# Patient Record
Sex: FEMALE | Race: BLACK OR AFRICAN AMERICAN | NOT HISPANIC OR LATINO | Employment: FULL TIME | URBAN - METROPOLITAN AREA
[De-identification: names, ages, dates, MRNs, and addresses within clinical notes are randomized per-mention and may not be internally consistent; named-entity substitution may affect disease eponyms.]

---

## 2017-07-30 ENCOUNTER — HOSPITAL ENCOUNTER (EMERGENCY)
Facility: HOSPITAL | Age: 22
Discharge: HOME | End: 2017-07-30
Attending: FAMILY MEDICINE
Payer: MEDICAID

## 2017-07-30 VITALS
BODY MASS INDEX: 43.82 KG/M2 | HEIGHT: 65 IN | HEART RATE: 84 BPM | OXYGEN SATURATION: 98 % | DIASTOLIC BLOOD PRESSURE: 70 MMHG | WEIGHT: 263 LBS | SYSTOLIC BLOOD PRESSURE: 125 MMHG | TEMPERATURE: 99 F | RESPIRATION RATE: 18 BRPM

## 2017-07-30 DIAGNOSIS — L72.0 EPIDERMOID CYST OF SKIN OF CHEST: Primary | ICD-10-CM

## 2017-07-30 PROCEDURE — 99282 EMERGENCY DEPT VISIT SF MDM: CPT | Mod: ,,, | Performed by: FAMILY MEDICINE

## 2017-07-30 PROCEDURE — 99283 EMERGENCY DEPT VISIT LOW MDM: CPT

## 2017-07-30 RX ORDER — SULFAMETHOXAZOLE AND TRIMETHOPRIM 800; 160 MG/1; MG/1
1 TABLET ORAL 2 TIMES DAILY
Qty: 14 TABLET | Refills: 0 | Status: SHIPPED | OUTPATIENT
Start: 2017-07-30 | End: 2017-08-06

## 2017-07-30 NOTE — ED NOTES
Patient identifiers verified and correct for Ms Ritchie  C/C: Abscess to mid chest area  APPEARANCE: awake and alert in NAD.  SKIN: warm, dry and intact. No breakdown or bruising.  MUSCULOSKELETAL: Patient moving all extremities spontaneously,noted to palpation, pain with mvmt Ambulates independently.  RESPIRATORY: Denies shortness of breath.Respirations unlabored.   CARDIAC: Denies CP, 2+ distal pulses; no peripheral edema  ABDOMEN: S/ND/NT, Denies nausea  : voids spontaneously, denies difficulty  Neurologic: AAO x 4; follows commands equal strength in all extremities; denies numbness/tingling. Denies dizziness

## 2017-07-31 NOTE — ED PROVIDER NOTES
Encounter Date: 7/30/2017    SCRIBE #1 NOTE: I, Roxana Scott, am scribing for, and in the presence of,  Dr. Saha. I have scribed the following portions of the note - Other sections scribed: HPI ROS PE.       History     Chief Complaint   Patient presents with    Chest Pain     i have a knot to my chest area, painful, just noticed last night     Time seen by provider: 7:04 PM    This is a 22 y.o. female PM Hx of asthma who presents with complaint of CP that she noticed last night. Pt states there is a knot in her chest that hurts and pain is exacerbated by movement. She also c/o SOB that feels like prior episodes of asthma. Pt denies nausea, vomiting, trouble swallowing, or any other symptoms at this time. She states she took a Tylenol with minimum relief of pain.       The history is provided by the patient and medical records.     Review of patient's allergies indicates:   Allergen Reactions    Shellfish containing products      Past Medical History:   Diagnosis Date    Asthma      Past Surgical History:   Procedure Laterality Date    GALLBLADDER SURGERY       Family History   Problem Relation Age of Onset    Heart disease Father      Social History   Substance Use Topics    Smoking status: Never Smoker    Smokeless tobacco: Never Used    Alcohol use No     Review of Systems   Constitutional: Negative for fever.   HENT: Negative for trouble swallowing.    Respiratory: Positive for shortness of breath (similar to prior episodes of asthma).    Cardiovascular: Positive for chest pain (associated to knot in her chest).   Gastrointestinal: Negative for nausea and vomiting.       Physical Exam     Initial Vitals [07/30/17 1843]   BP Pulse Resp Temp SpO2   125/70 84 18 99 °F (37.2 °C) 98 %      MAP       88.33         Physical Exam    Nursing note and vitals reviewed.  Constitutional: She appears well-developed and well-nourished. She is not diaphoretic. No distress.   HENT:   Head: Normocephalic and atraumatic.    Neck: Neck supple.   Cardiovascular: Normal rate, regular rhythm and normal heart sounds.   No murmur heard.  Pulmonary/Chest: Breath sounds normal. No respiratory distress. She has no wheezes. She has no rhonchi. She has no rales.   Small palpable subcutaneous nodule between the breasts that is smooth, round, and freely movable. Mild tenderness with no induration or fluctuance.    Neurological: She is alert and oriented to person, place, and time.   Skin: Skin is warm and dry.   No acute abnormalities.         ED Course   Procedures  Labs Reviewed - No data to display          Medical Decision Making:   History:   Old Medical Records: I decided to obtain old medical records.  ED Management:  Patient with a mildly tender, smooth mass in her left chest just adjacent to her left breast.  This appears to be superficial and an epidermoid origin.  Treat as an infected epidermoid cyst that she is cautioned that she should have this followed up.  Reexamined to ensure that it disappears and ensure that no workup for true breast mass is appropriate is indicated.  Patient stable for discharge            Scribe Attestation:   Scribe #1: I performed the above scribed service and the documentation accurately describes the services I performed. I attest to the accuracy of the note.    Attending Attestation:           Physician Attestation for Scribe:  Physician Attestation Statement for Scribe #1: I, Dr. Saha, reviewed documentation, as scribed by Roxana Scott in my presence, and it is both accurate and complete.                 ED Course     Clinical Impression:   The encounter diagnosis was Epidermoid cyst of skin of chest.    Disposition:   Disposition: Discharged  Condition: Stable                        Candido Saha MD  07/30/17 2791

## 2017-09-25 ENCOUNTER — HOSPITAL ENCOUNTER (EMERGENCY)
Facility: HOSPITAL | Age: 22
Discharge: HOME | End: 2017-09-25
Attending: EMERGENCY MEDICINE
Payer: MEDICAID

## 2017-09-25 VITALS
WEIGHT: 267 LBS | SYSTOLIC BLOOD PRESSURE: 115 MMHG | OXYGEN SATURATION: 97 % | RESPIRATION RATE: 16 BRPM | BODY MASS INDEX: 44.48 KG/M2 | DIASTOLIC BLOOD PRESSURE: 53 MMHG | HEART RATE: 80 BPM | TEMPERATURE: 99 F | HEIGHT: 65 IN
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
## 2017-09-25 DIAGNOSIS — J45.901 ASTHMA EXACERBATION: Primary | ICD-10-CM

## 2017-09-25 DIAGNOSIS — S00.81XA FOREHEAD ABRASION, INITIAL ENCOUNTER: ICD-10-CM
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
## 2017-09-25 PROCEDURE — 99283 EMERGENCY DEPT VISIT LOW MDM: CPT | Mod: 25

## 2017-09-25 PROCEDURE — 99284 EMERGENCY DEPT VISIT MOD MDM: CPT | Mod: ,,, | Performed by: PHYSICIAN ASSISTANT

## 2017-09-25 PROCEDURE — 25000242 PHARM REV CODE 250 ALT 637 W/ HCPCS: Performed by: PHYSICIAN ASSISTANT

## 2017-09-25 PROCEDURE — 63600175 PHARM REV CODE 636 W HCPCS: Performed by: PHYSICIAN ASSISTANT

## 2017-09-25 PROCEDURE — 94640 AIRWAY INHALATION TREATMENT: CPT

## 2017-09-25 PROCEDURE — 25000003 PHARM REV CODE 250: Performed by: PHYSICIAN ASSISTANT

## 2017-09-25 RX ORDER — ALBUTEROL SULFATE 90 UG/1
2 AEROSOL, METERED RESPIRATORY (INHALATION) EVERY 6 HOURS PRN
COMMUNITY

## 2017-09-25 RX ORDER — NAPROXEN 500 MG/1
500 TABLET ORAL
Status: COMPLETED | OUTPATIENT
Start: 2017-09-25 | End: 2017-09-25

## 2017-09-25 RX ORDER — IPRATROPIUM BROMIDE AND ALBUTEROL SULFATE 2.5; .5 MG/3ML; MG/3ML
3 SOLUTION RESPIRATORY (INHALATION)
Status: COMPLETED | OUTPATIENT
Start: 2017-09-25 | End: 2017-09-25

## 2017-09-25 RX ORDER — PREDNISONE 20 MG/1
60 TABLET ORAL
Status: COMPLETED | OUTPATIENT
Start: 2017-09-25 | End: 2017-09-25

## 2017-09-25 RX ORDER — PREDNISONE 20 MG/1
40 TABLET ORAL DAILY
Qty: 10 TABLET | Refills: 0 | Status: SHIPPED | OUTPATIENT
Start: 2017-09-25 | End: 2017-09-30

## 2017-09-25 RX ADMIN — IPRATROPIUM BROMIDE AND ALBUTEROL SULFATE 3 ML: .5; 3 SOLUTION RESPIRATORY (INHALATION) at 10:09

## 2017-09-25 RX ADMIN — PREDNISONE 60 MG: 20 TABLET ORAL at 11:09

## 2017-09-25 RX ADMIN — NAPROXEN 500 MG: 500 TABLET ORAL at 09:09
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
## 2018-03-01 ENCOUNTER — HOSPITAL ENCOUNTER (EMERGENCY)
Facility: OTHER | Age: 23
Discharge: HOME | End: 2018-03-02
Attending: EMERGENCY MEDICINE
Payer: MEDICAID

## 2018-03-01 VITALS
OXYGEN SATURATION: 98 % | HEART RATE: 62 BPM | SYSTOLIC BLOOD PRESSURE: 130 MMHG | BODY MASS INDEX: 38.77 KG/M2 | RESPIRATION RATE: 20 BRPM | TEMPERATURE: 98 F | DIASTOLIC BLOOD PRESSURE: 69 MMHG | WEIGHT: 247 LBS | HEIGHT: 67 IN

## 2018-03-01 DIAGNOSIS — K08.89 PAIN, DENTAL: Primary | ICD-10-CM

## 2018-03-01 DIAGNOSIS — H92.02 OTALGIA OF LEFT EAR: ICD-10-CM

## 2018-03-01 LAB
B-HCG UR QL: NEGATIVE
CTP QC/QA: YES

## 2018-03-01 PROCEDURE — 81025 URINE PREGNANCY TEST: CPT | Performed by: EMERGENCY MEDICINE

## 2018-03-01 PROCEDURE — 99283 EMERGENCY DEPT VISIT LOW MDM: CPT | Mod: 25

## 2018-03-01 RX ORDER — PENICILLIN V POTASSIUM 500 MG/1
500 TABLET, FILM COATED ORAL 4 TIMES DAILY
Qty: 40 TABLET | Refills: 0 | Status: SHIPPED | OUTPATIENT
Start: 2018-03-01 | End: 2018-03-08

## 2018-03-01 RX ORDER — IBUPROFEN 800 MG/1
800 TABLET ORAL EVERY 6 HOURS PRN
Qty: 30 TABLET | Refills: 0 | OUTPATIENT
Start: 2018-03-01 | End: 2020-02-04

## 2018-03-01 RX ORDER — IBUPROFEN 400 MG/1
800 TABLET ORAL
Status: COMPLETED | OUTPATIENT
Start: 2018-03-02 | End: 2018-03-02

## 2018-03-02 PROCEDURE — 25000003 PHARM REV CODE 250: Performed by: EMERGENCY MEDICINE

## 2018-03-02 RX ADMIN — IBUPROFEN 800 MG: 400 TABLET, FILM COATED ORAL at 12:03

## 2018-03-02 NOTE — ED TRIAGE NOTES
"+left ear "sharp" pain that radiates to jaw that started around 3pm. Pt denies fever, sob, fever, dizziness, vision change, hearing loss. Pt denies eating anything hard or that could cause this pain.  Pt denies taking any medication for the pain     "

## 2018-03-02 NOTE — ED PROVIDER NOTES
"Encounter Date: 3/1/2018    SCRIBE #1 NOTE: I, Deepthi Tapia, am scribing for, and in the presence of, Dr. Matta.       History     Chief Complaint   Patient presents with    Otalgia     Pt states she has L ear pain, L neck and face x 1 day.      Time seen by provider: 11:22 PM    This is a 23 y.o. Female, with history of asthma, who presents with complaint of severe left-sided dental pain that has been constant approximately eight hours ago. The upper and lower dental pain is described as severe. She has not seen a dentist "in a while," and denies undergoing a wisdom tooth extraction. The patient reports left ear pain that radiates to the jaw. She denies pain to the external ear, but has noticed that her ears have "popped" during the day. She reports taking Goody's with no improvement. Patient reports congestion, and denies fever, chills, decreased hearing, ear drainage, trouble swallowing, facial swelling, sore throat, cough, SOB, headache, or myalgias.  She denies use of tobacco.        The history is provided by the patient.     Review of patient's allergies indicates:   Allergen Reactions    Shellfish containing products      Past Medical History:   Diagnosis Date    Asthma      Past Surgical History:   Procedure Laterality Date    CHOLECYSTECTOMY      GALLBLADDER SURGERY       Family History   Problem Relation Age of Onset    Heart disease Father      Social History   Substance Use Topics    Smoking status: Never Smoker    Smokeless tobacco: Never Used    Alcohol use No     Review of Systems   Constitutional: Negative for chills and fever.   HENT: Positive for congestion, dental problem (left upper and lower dental pain) and ear pain (left). Negative for ear discharge, facial swelling, hearing loss, sore throat and trouble swallowing.    Respiratory: Negative for cough and shortness of breath.    Cardiovascular: Negative for chest pain.   Gastrointestinal: Negative for nausea.   Genitourinary: " Negative for dysuria.   Musculoskeletal: Negative for back pain and myalgias.   Skin: Negative for rash.   Neurological: Negative for weakness and headaches.   Hematological: Does not bruise/bleed easily.       Physical Exam     Initial Vitals [03/01/18 2234]   BP Pulse Resp Temp SpO2   130/69 62 20 98.4 °F (36.9 °C) 98 %      MAP       89.33         Physical Exam    Nursing note and vitals reviewed.  Constitutional: She appears well-developed and well-nourished. She is not diaphoretic. No distress.   Morbidly Obese.   HENT:   Head: Normocephalic and atraumatic.   Right Ear: Tympanic membrane and external ear normal.   Left Ear: Tympanic membrane and external ear normal.   Mouth/Throat: Oropharynx is clear and moist.   Tenderness with percussion of the left rear most maxillary and mandibular molars. Mild erythema of the adjacent gums. No visible abscess. No trismus. No otitis media.   Eyes: EOM are normal. Pupils are equal, round, and reactive to light. No scleral icterus.   No pallor or icterus.   Neck: Normal range of motion.   Cardiovascular: Normal rate, regular rhythm and normal heart sounds. Exam reveals no gallop and no friction rub.    No murmur heard.  Pulmonary/Chest: Breath sounds normal. No respiratory distress. She has no wheezes. She has no rhonchi. She has no rales.   Abdominal: Soft. There is no tenderness. There is no rebound and no guarding.   Musculoskeletal: Normal range of motion. She exhibits no edema or tenderness.   Lymphadenopathy:     She has no cervical adenopathy.   Neurological: She is alert and oriented to person, place, and time.   Skin: Skin is warm and dry. No rash and no abscess noted. No erythema. No pallor.   Psychiatric: She has a normal mood and affect. Her behavior is normal. Judgment and thought content normal.         ED Course   Procedures  Labs Reviewed   POCT URINE PREGNANCY             Medical Decision Making:   Clinical Tests:   Lab Tests: Ordered and Reviewed             Scribe Attestation:   Scribe #1: I performed the above scribed service and the documentation accurately describes the services I performed. I attest to the accuracy of the note.    Attending Attestation:           Physician Attestation for Scribe:  Physician Attestation Statement for Scribe #1: I, Dr. Matta, reviewed documentation, as scribed by Deepthi Tapia in my presence, and it is both accurate and complete.           Patient presents with ear and dental pain.  On exam no evidence of otitis media but she does have tenderness to percussion of teeth.  Suspect dental source of pain.  Start course of penicillin for possible dental infection along with anti-inflammatories.  Stressed need to follow-up with dental clinic reports she already has a dentist             Clinical Impression:       1. Pain, dental    2. Otalgia of left ear                          Harpal Matta II, MD  03/02/18 0203

## 2018-03-04 ENCOUNTER — HOSPITAL ENCOUNTER (EMERGENCY)
Facility: OTHER | Age: 23
Discharge: HOME | End: 2018-03-04
Attending: EMERGENCY MEDICINE
Payer: MEDICAID

## 2018-03-04 VITALS
TEMPERATURE: 98 F | OXYGEN SATURATION: 99 % | DIASTOLIC BLOOD PRESSURE: 57 MMHG | SYSTOLIC BLOOD PRESSURE: 121 MMHG | HEIGHT: 66 IN | WEIGHT: 247 LBS | RESPIRATION RATE: 18 BRPM | HEART RATE: 72 BPM | BODY MASS INDEX: 39.7 KG/M2

## 2018-03-04 DIAGNOSIS — H10.9 CONJUNCTIVITIS OF LEFT EYE, UNSPECIFIED CONJUNCTIVITIS TYPE: Primary | ICD-10-CM

## 2018-03-04 PROCEDURE — 99283 EMERGENCY DEPT VISIT LOW MDM: CPT

## 2018-03-04 RX ORDER — ERYTHROMYCIN 5 MG/G
OINTMENT OPHTHALMIC
Qty: 1 TUBE | Refills: 0 | Status: SHIPPED | OUTPATIENT
Start: 2018-03-04 | End: 2020-02-04 | Stop reason: CLARIF

## 2018-03-04 NOTE — ED PROVIDER NOTES
Encounter Date: 3/4/2018    SCRIBE #1 NOTE: I, Kamlesh Vega, am scribing for, and in the presence of, Dr. Rae.       History     Chief Complaint   Patient presents with    Conjunctivitis     L eye pain and redness since this morning. currently being tx for dental pain.     3:55 PM    Patient is a 23 y.o. female who presents to the ED with complaint of left eye pain, which began this morning. Pain is constant. She reports associated redness as well as discharge from the left eye. She reports no problems with the right eye, rash, or shortness of breath. She reports no known contacts with similar symptoms. She works at a school. She reports taking penicillin and ibuprofen this morning for dental pain. She also took Claritin prior to onset of eye pain. She reports pain in all four wisdom teeth, and reports plans to have these extracted. She has a dentist appointment tomorrow. She has no additional complaints.      The history is provided by the patient.     Review of patient's allergies indicates:   Allergen Reactions    Shellfish containing products      Past Medical History:   Diagnosis Date    Asthma      Past Surgical History:   Procedure Laterality Date    CHOLECYSTECTOMY      GALLBLADDER SURGERY       Family History   Problem Relation Age of Onset    Heart disease Father      Social History   Substance Use Topics    Smoking status: Never Smoker    Smokeless tobacco: Never Used    Alcohol use No     Review of Systems   Constitutional: Negative for chills and fever.   HENT: Positive for dental problem.    Eyes: Positive for pain (left), discharge (left) and redness (left).   Respiratory: Negative for shortness of breath.    Cardiovascular: Negative for chest pain.   Gastrointestinal: Negative for nausea and vomiting.   Musculoskeletal: Negative for back pain.   Skin: Negative for rash.   Neurological: Negative for dizziness and headaches.   Hematological: Does not bruise/bleed easily.    Psychiatric/Behavioral: Negative for confusion.       Physical Exam     Initial Vitals [03/04/18 1550]   BP Pulse Resp Temp SpO2   (!) 121/57 72 18 98.3 °F (36.8 °C) 99 %      MAP       78.33         Physical Exam    Nursing note and vitals reviewed.  Constitutional: She appears well-developed and well-nourished. No distress.   HENT:   Head: Normocephalic and atraumatic.   Mouth/Throat: Oropharynx is clear and moist.   Eyes: EOM are normal. Pupils are equal, round, and reactive to light. Left eye exhibits discharge.   Left eye: injected conjunctivae. Discharge present from the left eye. No periorbital swelling. No pain with movement of the eye ball. EOMI.   Neck: Neck supple.   Pulmonary/Chest: No respiratory distress.   Musculoskeletal: Normal range of motion.   Neurological: She is alert and oriented to person, place, and time.   Skin: Skin is warm and dry.   Psychiatric: She has a normal mood and affect. Her behavior is normal. Judgment and thought content normal.         ED Course   Procedures  Labs Reviewed   POCT URINE PREGNANCY             Medical Decision Making:   Clinical Tests:   Lab Tests: Ordered and Reviewed  ED Management:  23-year-old female with what appears to be conjunctivitis of the left eye.  No history suggest toenail abrasion.  No signs of orbital or periorbital cellulitis.  We'll discharge with erythromycin ointment.            Scribe Attestation:   Scribe #1: I performed the above scribed service and the documentation accurately describes the services I performed. I attest to the accuracy of the note.    Attending Attestation:           Physician Attestation for Scribe:  Physician Attestation Statement for Scribe #1: I, Dr. Rae, reviewed documentation, as scribed by Kamlesh Vega in my presence, and it is both accurate and complete.                    Clinical Impression:     1. Conjunctivitis of left eye, unspecified conjunctivitis type                             Devendra LAZARO  DO Butch  03/04/18 1646

## 2018-03-04 NOTE — ED TRIAGE NOTES
"Pt presents to the ED with conjunctivitis to her left eye starting today. Pt states that she woke up from a nap and it was worse. Pain is unrelieved by ibuprofen. Pain is rated 8/10, described as "stabbing".   "

## 2018-03-04 NOTE — ED NOTES
Patient informed of need for urine sample for analysis. Pt states she is unable to go at present but was just seen in the ED Thursday. Specimen cup provided, patient will notify me when sample is obtained.

## 2018-08-15 ENCOUNTER — HOSPITAL ENCOUNTER (EMERGENCY)
Facility: OTHER | Age: 23
Discharge: HOME | End: 2018-08-15
Attending: EMERGENCY MEDICINE
Payer: MEDICAID

## 2018-08-15 VITALS
TEMPERATURE: 99 F | OXYGEN SATURATION: 99 % | WEIGHT: 264 LBS | BODY MASS INDEX: 46.78 KG/M2 | DIASTOLIC BLOOD PRESSURE: 51 MMHG | SYSTOLIC BLOOD PRESSURE: 100 MMHG | HEART RATE: 82 BPM | RESPIRATION RATE: 18 BRPM | HEIGHT: 63 IN

## 2018-08-15 DIAGNOSIS — R07.9 CHEST PAIN: Primary | ICD-10-CM

## 2018-08-15 DIAGNOSIS — J45.20 MILD INTERMITTENT ASTHMA WITHOUT COMPLICATION: ICD-10-CM

## 2018-08-15 LAB
B-HCG UR QL: NEGATIVE
CTP QC/QA: YES

## 2018-08-15 PROCEDURE — 94761 N-INVAS EAR/PLS OXIMETRY MLT: CPT

## 2018-08-15 PROCEDURE — 25000242 PHARM REV CODE 250 ALT 637 W/ HCPCS: Performed by: PHYSICIAN ASSISTANT

## 2018-08-15 PROCEDURE — 93010 ELECTROCARDIOGRAM REPORT: CPT | Mod: ,,, | Performed by: INTERNAL MEDICINE

## 2018-08-15 PROCEDURE — 81025 URINE PREGNANCY TEST: CPT | Performed by: EMERGENCY MEDICINE

## 2018-08-15 PROCEDURE — 94640 AIRWAY INHALATION TREATMENT: CPT

## 2018-08-15 PROCEDURE — 93005 ELECTROCARDIOGRAM TRACING: CPT

## 2018-08-15 PROCEDURE — 25000003 PHARM REV CODE 250: Performed by: PHYSICIAN ASSISTANT

## 2018-08-15 PROCEDURE — 99284 EMERGENCY DEPT VISIT MOD MDM: CPT | Mod: 25

## 2018-08-15 RX ORDER — IPRATROPIUM BROMIDE AND ALBUTEROL SULFATE 2.5; .5 MG/3ML; MG/3ML
3 SOLUTION RESPIRATORY (INHALATION)
Status: COMPLETED | OUTPATIENT
Start: 2018-08-15 | End: 2018-08-15

## 2018-08-15 RX ORDER — NAPROXEN 500 MG/1
500 TABLET ORAL 2 TIMES DAILY WITH MEALS
Qty: 20 TABLET | Refills: 0 | OUTPATIENT
Start: 2018-08-15 | End: 2020-02-04

## 2018-08-15 RX ORDER — ORPHENADRINE CITRATE 100 MG/1
100 TABLET, EXTENDED RELEASE ORAL
Status: COMPLETED | OUTPATIENT
Start: 2018-08-15 | End: 2018-08-15

## 2018-08-15 RX ADMIN — IPRATROPIUM BROMIDE AND ALBUTEROL SULFATE 3 ML: .5; 3 SOLUTION RESPIRATORY (INHALATION) at 06:08

## 2018-08-15 RX ADMIN — ORPHENADRINE CITRATE 100 MG: 100 TABLET, EXTENDED RELEASE ORAL at 07:08

## 2018-08-15 RX ADMIN — IPRATROPIUM BROMIDE AND ALBUTEROL SULFATE 3 ML: .5; 3 SOLUTION RESPIRATORY (INHALATION) at 07:08

## 2018-08-15 NOTE — ED PROVIDER NOTES
Encounter Date: 8/15/2018       History     Chief Complaint   Patient presents with    Chest Pain     Pt reports midsternal chest tightness beginning Monday and progressively worsening. Reports mild SOB.     Patient is a 23-year-old female with asthma who presents to the ED with chest pain. Patient reports gradual onset of midsternal chest pain while she was at work 3 days ago.  She states the pain is constantly present.  She reports taking Motrin without relief.  She denies any radiation of this pain. She states the pain is worse with breathing.  She denies any relieving factors.  Patient reports using her breathing treatment this morning.  She states not provide her with relief.  She states consult similar to an asthma flare up.  She denies fever, cough, or URI type symptoms. She denies leg swelling.       The history is provided by the patient.     Review of patient's allergies indicates:   Allergen Reactions    Shellfish containing products      Past Medical History:   Diagnosis Date    Asthma      Past Surgical History:   Procedure Laterality Date    CHOLECYSTECTOMY      GALLBLADDER SURGERY       Family History   Problem Relation Age of Onset    Heart disease Father      Social History     Tobacco Use    Smoking status: Never Smoker    Smokeless tobacco: Never Used   Substance Use Topics    Alcohol use: No    Drug use: No     Review of Systems   Constitutional: Negative for chills and fever.   HENT: Negative for congestion and sore throat.    Eyes: Negative for pain.   Respiratory: Positive for shortness of breath. Negative for cough and wheezing.    Cardiovascular: Positive for chest pain.   Gastrointestinal: Negative for abdominal pain, diarrhea, nausea and vomiting.   Genitourinary: Negative for dysuria.   Musculoskeletal: Negative for arthralgias.   Skin: Negative for rash.   Neurological: Negative for headaches.       Physical Exam     Initial Vitals [08/15/18 1757]   BP Pulse Resp Temp SpO2    117/70 85 16 98.8 °F (37.1 °C) 96 %      MAP       --         Physical Exam    Constitutional: Vital signs are normal. She is cooperative.   Patient appears to be in no acute distress.  She speaks in clear in full sentences.   HENT:   Head: Normocephalic and atraumatic.   Mouth/Throat: Oropharynx is clear and moist.   Eyes: EOM are normal. Pupils are equal, round, and reactive to light.   Neck: Normal range of motion. Neck supple.   Cardiovascular: Normal rate, regular rhythm and intact distal pulses.   Pulmonary/Chest: No respiratory distress.       Mildly decreased air movement to bilateral lung fields.  No wheezing.  Chest wall tender to palpation in the depicted areas above.   Abdominal: Soft. Bowel sounds are normal. There is no tenderness.   Musculoskeletal: Normal range of motion. She exhibits no edema.   Neurological: She is alert and oriented to person, place, and time. No cranial nerve deficit. GCS eye subscore is 4. GCS verbal subscore is 5. GCS motor subscore is 6.   Skin: Skin is warm and dry. Capillary refill takes less than 2 seconds. No rash noted.   Psychiatric: She has a normal mood and affect. Her behavior is normal.         ED Course   Procedures  Labs Reviewed   POCT URINE PREGNANCY     EKG Readings: (Independently Interpreted)   Sinus bradycardia rate of 57 beats per minute.  No STEMI.  No ischemic changes.  T-wave inversions from previous EKG from 2014 have resolved.       Imaging Results          X-Ray Chest PA And Lateral (Final result)  Result time 08/15/18 19:30:55    Final result by Beth Melgar MD (08/15/18 19:30:55)                 Impression:      No acute intrathoracic process identified.      Electronically signed by: Beth Melgar MD  Date:    08/15/2018  Time:    19:30             Narrative:    EXAMINATION:  XR CHEST PA AND LATERAL    CLINICAL HISTORY:  Chest pain, unspecified    TECHNIQUE:  PA and lateral views of the chest were performed.    COMPARISON:  September  2016.    FINDINGS:  Cardiac silhouette is normal in size.  Lungs are symmetrically expanded.  No evidence of focal consolidative process, pneumothorax, or significant effusion.  No acute osseous abnormality identified.                                 Medical Decision Making:   Initial Assessment:   Urgent evaluation of a 23 y.o. Female presenting to the emergency department complaining of chest pain. Patient is afebrile, nontoxic appearing and hemodynamically stable.  Patient's chest pain appears to be musculoskeletal in nature.  Chest pain is reproducible with palpation on exam.  I do not suspect ACS.  She is PERC negative.  I do not suspect PE.  No wheezing on exam, however there is diminished air movement.  Will provide breathing treatment and obtain chest x-ray and reassess.  ED Management:  Chest x-ray reveals no acute intra cardio thoracic process.  After receiving duo nebulizer treatment, patient reports the her shortness of breath has improved.  Patient reports no was leave with Norflex.  She states this made her feel drowsy.  Patient's pain is likely secondary to musculoskeletal strain versus costochondritis.  Patient states Motrin home is not working.  Will send home with [facility].  She is strongly encouraged to follow up with her primary care provider within the next week and return here for new or worsening symptoms. She has no other complaints this time is stable for discharge.  This note was created using [employer]. There may be typographical errors secondary to dictation.                       Clinical Impression:     1. Chest pain    2. Mild intermittent asthma without complication                               Nguyễn Blue PA-C  08/15/18 1953

## 2018-08-15 NOTE — ED NOTES
Pt unable to urinate at this time. Pt given specimen cup and instructions on proper clean catch technique.

## 2018-08-15 NOTE — ED TRIAGE NOTES
22 yo female to ED w/ complaints of chest pain and SOB. Pt has hx of asthma. AAO x3, 96% on RA, VSS, ED workup in progress.

## 2018-12-20 ENCOUNTER — HOSPITAL ENCOUNTER (EMERGENCY)
Facility: OTHER | Age: 23
Discharge: HOME | End: 2018-12-20
Attending: EMERGENCY MEDICINE
Payer: MEDICAID

## 2018-12-20 VITALS
BODY MASS INDEX: 48.15 KG/M2 | WEIGHT: 289 LBS | TEMPERATURE: 98 F | HEART RATE: 66 BPM | DIASTOLIC BLOOD PRESSURE: 54 MMHG | SYSTOLIC BLOOD PRESSURE: 96 MMHG | RESPIRATION RATE: 20 BRPM | HEIGHT: 65 IN | OXYGEN SATURATION: 95 %

## 2018-12-20 DIAGNOSIS — R10.32 LEFT LOWER QUADRANT PAIN: Primary | ICD-10-CM

## 2018-12-20 DIAGNOSIS — N94.9 ADNEXAL CYST: ICD-10-CM

## 2018-12-20 LAB
ANION GAP SERPL CALC-SCNC: 6 MMOL/L
B-HCG UR QL: NEGATIVE
BASOPHILS # BLD AUTO: 0.03 K/UL
BASOPHILS NFR BLD: 0.4 %
BILIRUB UR QL STRIP: NEGATIVE
BUN SERPL-MCNC: 10 MG/DL
CALCIUM SERPL-MCNC: 9.5 MG/DL
CHLORIDE SERPL-SCNC: 107 MMOL/L
CLARITY UR: CLEAR
CO2 SERPL-SCNC: 24 MMOL/L
COLOR UR: YELLOW
CREAT SERPL-MCNC: 0.8 MG/DL
CTP QC/QA: YES
DIFFERENTIAL METHOD: NORMAL
EOSINOPHIL # BLD AUTO: 0.2 K/UL
EOSINOPHIL NFR BLD: 2.1 %
ERYTHROCYTE [DISTWIDTH] IN BLOOD BY AUTOMATED COUNT: 13.8 %
EST. GFR  (AFRICAN AMERICAN): >60 ML/MIN/1.73 M^2
EST. GFR  (NON AFRICAN AMERICAN): >60 ML/MIN/1.73 M^2
GLUCOSE SERPL-MCNC: 128 MG/DL
GLUCOSE UR QL STRIP: NEGATIVE
HCT VFR BLD AUTO: 38.1 %
HGB BLD-MCNC: 12.5 G/DL
HGB UR QL STRIP: NEGATIVE
KETONES UR QL STRIP: NEGATIVE
LEUKOCYTE ESTERASE UR QL STRIP: NEGATIVE
LYMPHOCYTES # BLD AUTO: 2.2 K/UL
LYMPHOCYTES NFR BLD: 30.4 %
MCH RBC QN AUTO: 28.8 PG
MCHC RBC AUTO-ENTMCNC: 32.8 G/DL
MCV RBC AUTO: 88 FL
MONOCYTES # BLD AUTO: 0.5 K/UL
MONOCYTES NFR BLD: 7.5 %
NEUTROPHILS # BLD AUTO: 4.2 K/UL
NEUTROPHILS NFR BLD: 59.3 %
NITRITE UR QL STRIP: NEGATIVE
PH UR STRIP: 6 [PH] (ref 5–8)
PLATELET # BLD AUTO: 291 K/UL
PMV BLD AUTO: 10.2 FL
POTASSIUM SERPL-SCNC: 4.3 MMOL/L
PROT UR QL STRIP: NEGATIVE
RBC # BLD AUTO: 4.34 M/UL
SODIUM SERPL-SCNC: 137 MMOL/L
SP GR UR STRIP: 1.02 (ref 1–1.03)
URN SPEC COLLECT METH UR: NORMAL
UROBILINOGEN UR STRIP-ACNC: 1 EU/DL
WBC # BLD AUTO: 7.1 K/UL

## 2018-12-20 PROCEDURE — 25500020 PHARM REV CODE 255: Performed by: EMERGENCY MEDICINE

## 2018-12-20 PROCEDURE — 81003 URINALYSIS AUTO W/O SCOPE: CPT

## 2018-12-20 PROCEDURE — 96374 THER/PROPH/DIAG INJ IV PUSH: CPT

## 2018-12-20 PROCEDURE — 96375 TX/PRO/DX INJ NEW DRUG ADDON: CPT

## 2018-12-20 PROCEDURE — 81025 URINE PREGNANCY TEST: CPT | Performed by: EMERGENCY MEDICINE

## 2018-12-20 PROCEDURE — 85025 COMPLETE CBC W/AUTO DIFF WBC: CPT

## 2018-12-20 PROCEDURE — 96376 TX/PRO/DX INJ SAME DRUG ADON: CPT

## 2018-12-20 PROCEDURE — 63600175 PHARM REV CODE 636 W HCPCS: Performed by: PHYSICIAN ASSISTANT

## 2018-12-20 PROCEDURE — 99284 EMERGENCY DEPT VISIT MOD MDM: CPT | Mod: 25

## 2018-12-20 PROCEDURE — 80048 BASIC METABOLIC PNL TOTAL CA: CPT

## 2018-12-20 RX ORDER — MORPHINE SULFATE 2 MG/ML
6 INJECTION, SOLUTION INTRAMUSCULAR; INTRAVENOUS
Status: COMPLETED | OUTPATIENT
Start: 2018-12-20 | End: 2018-12-20

## 2018-12-20 RX ORDER — ETODOLAC 500 MG/1
500 TABLET, FILM COATED ORAL EVERY 12 HOURS PRN
Qty: 20 TABLET | Refills: 0 | Status: SHIPPED | OUTPATIENT
Start: 2018-12-20

## 2018-12-20 RX ORDER — ONDANSETRON 2 MG/ML
4 INJECTION INTRAMUSCULAR; INTRAVENOUS
Status: COMPLETED | OUTPATIENT
Start: 2018-12-20 | End: 2018-12-20

## 2018-12-20 RX ADMIN — IOHEXOL 100 ML: 350 INJECTION, SOLUTION INTRAVENOUS at 09:12

## 2018-12-20 RX ADMIN — ONDANSETRON 4 MG: 2 INJECTION INTRAMUSCULAR; INTRAVENOUS at 08:12

## 2018-12-20 RX ADMIN — MORPHINE SULFATE 6 MG: 2 INJECTION, SOLUTION INTRAMUSCULAR; INTRAVENOUS at 07:12

## 2018-12-20 RX ADMIN — MORPHINE SULFATE 6 MG: 2 INJECTION, SOLUTION INTRAMUSCULAR; INTRAVENOUS at 08:12

## 2018-12-21 NOTE — ED PROVIDER NOTES
Encounter Date: 12/20/2018       History     Chief Complaint   Patient presents with    Abdominal Pain     pt reports lower abd pain x 1 week; pt describes pain as shooting pain from lower abd left side to the left lower back side; pt denies NVD; denies GI/ symptoms     Patient is a 23-year-old female with asthma who presents to the ED with pelvic pain. Patient reports 1 week history of left-sided pelvic pain. She states she saw her Ob Gyn 2 days ago who checked her Mirena strings (patient had Mirena placed over 3 years ago).  She was told it this strength ran good position.  Patient has outpatient ultrasound scheduled for next week was told to come the ED the pain worsened.  Patient states the pain is getting more severe and is not radiating to her lower back.  She states the pain waxes and wanes.  She denies dysuria, hematuria, fever, nausea, or emesis.  She denies vaginal bleeding or vaginal discharge. She also had negative STD workup 2 days ago.  She reports taking 100 mg of Motrin this morning without relief of her pain.      The history is provided by the patient.     Review of patient's allergies indicates:   Allergen Reactions    Shellfish containing products      Past Medical History:   Diagnosis Date    Asthma      Past Surgical History:   Procedure Laterality Date    CHOLECYSTECTOMY      GALLBLADDER SURGERY       Family History   Problem Relation Age of Onset    Heart disease Father      Social History     Tobacco Use    Smoking status: Never Smoker    Smokeless tobacco: Never Used   Substance Use Topics    Alcohol use: No    Drug use: No     Review of Systems   Constitutional: Negative for chills and fever.   HENT: Negative for congestion and sore throat.    Eyes: Negative for pain.   Respiratory: Negative for shortness of breath.    Cardiovascular: Negative for chest pain.   Gastrointestinal: Negative for diarrhea, nausea and vomiting.   Genitourinary: Positive for pelvic pain. Negative for  dysuria, vaginal bleeding, vaginal discharge and vaginal pain.   Musculoskeletal: Negative for arthralgias.   Skin: Negative for rash.   Neurological: Negative for headaches.       Physical Exam     Initial Vitals [12/20/18 1834]   BP Pulse Resp Temp SpO2   116/79 82 18 98.3 °F (36.8 °C) 99 %      MAP       --         Physical Exam    Constitutional: Vital signs are normal. She is cooperative.   Obese AA female in no acute distress    HENT:   Head: Normocephalic and atraumatic.   Eyes: EOM are normal. Pupils are equal, round, and reactive to light.   Neck: Normal range of motion. Neck supple.   Cardiovascular: Normal rate, regular rhythm and intact distal pulses.   Pulmonary/Chest: Breath sounds normal. She has no wheezes. She has no rhonchi. She has no rales.   Abdominal: Soft. Bowel sounds are normal.   Left, lower pelvic pain. No peritoneal signs.   Musculoskeletal: Normal range of motion. She exhibits no edema.   Neurological: She is alert and oriented to person, place, and time. GCS eye subscore is 4. GCS verbal subscore is 5. GCS motor subscore is 6.   Skin: Skin is warm and dry. Capillary refill takes less than 2 seconds. No rash noted.   Psychiatric: She has a normal mood and affect. Her behavior is normal.         ED Course   Procedures  Labs Reviewed   BASIC METABOLIC PANEL - Abnormal; Notable for the following components:       Result Value    Glucose 128 (*)     Anion Gap 6 (*)     All other components within normal limits   URINALYSIS   CBC W/ AUTO DIFFERENTIAL   POCT URINE PREGNANCY          Imaging Results          CT Abdomen Pelvis With Contrast (Final result)  Result time 12/20/18 21:56:17    Final result by Kris Weaver MD (12/20/18 21:56:17)                 Impression:      IUD present.  2.3 cm left adnexal cyst.    No bowel obstruction or inflammatory changes noted.  No CT findings to suggest acute diverticulitis.    Additional findings as above.      Electronically signed by: Kris  MD Meg  Date:    12/20/2018  Time:    21:56             Narrative:    EXAMINATION:  CT ABDOMEN PELVIS WITH CONTRAST    CLINICAL HISTORY:  LLQ pain, suspect diverticulitis;    TECHNIQUE:  Low dose axial images, sagittal and coronal reformations were obtained from the lung bases to the pubic symphysis following the IV administration of 100 mL of Omnipaque 350 .  Oral contrast was not administered.    COMPARISON:  None.    FINDINGS:  Abdomen:    - Lower thorax:Unremarkable.    - Lung bases: Minimal dependent atelectatic change.    - Liver: No focal mass.    - Gallbladder: Surgically absent.    - Bile Ducts: No evidence of intra or extra hepatic biliary ductal dilation.    - Spleen: Negative.    - Kidneys: No hydronephrosis.  2.1 cm septated cyst lower pole left kidney.    - Adrenals: Unremarkable.    - Pancreas: No mass or peripancreatic fat stranding.    - Retroperitoneum:  No significant adenopathy.    - Vascular: No abdominal aortic aneurysm.    - Abdominal wall:  Unremarkable.    Pelvis:    No pelvic mass, adenopathy, or free fluid.  IUD present.  2.3 cm left adnexal cyst.    Bowel/Mesentery:    No evidence of bowel obstruction or inflammation.  Normal appendix.    Bones:  No acute osseous abnormality and no suspicious lytic or blastic lesion.                               US Pelvis Comp with Transvag NON-OB (xpd) (Final result)  Result time 12/20/18 20:57:58   Procedure changed from US Pelvis Complete Non OB     Final result by Beth Melgar MD (12/20/18 20:57:58)                 Impression:      IUD appears in appropriate position.    Otherwise no significant abnormalities identified.      Electronically signed by: Beth Melgar MD  Date:    12/20/2018  Time:    20:57             Narrative:    EXAMINATION:  US PELVIS COMP WITH TRANSVAG NON-OB (XPD)    CLINICAL HISTORY:  Left adnexal pain;    TECHNIQUE:  Transabdominal sonography of the pelvis was performed, followed by transvaginal sonography to better  evaluate the uterus and ovaries.    COMPARISON:  None.    FINDINGS:  The uterus measures 8.0 x 3.6 x 7.3 cm. Uterine parenchyma is somewhat heterogenous in echotexture without evidence for masses.  IUD is visualized and appears in appropriate position.  No evidence of abnormal endometrial thickening.    The right ovary is normal in size and measures 2.5 x 2.8 x 2.7 cm. The left ovary is normal in size and measures 2.1 x 2.5 x 4.2 cm.  Small cyst or dominant follicle is visualized within each ovary, which are within normal limits for patient of this age.  No adnexal abnormalities are seen.  Arterial and venous flow are preserved bilaterally. No significant free fluid is seen.                                 Medical Decision Making:   Initial Assessment:   Urgent evaluation of a 23 y.o. female presenting to the emergency department complaining of left-sided pelvic pain. Patient is afebrile, nontoxic appearing and hemodynamically stable.  Patient has significant tenderness on exam.  No peritoneal signs. Will obtain labs and ultrasound to rule out ovarian torsion versus ruptured ovarian cyst versus TOA.    ED Management:  UPT is negative.  Urinalysis reveals no abnormalities.  2050:  Patient reports her pain is unrelieved with 6 mg of IV morphine.  Will provide another 6 mg and reassess.  CBC reveals no leukocytosis or other abnormalities.  Metabolic panel reveals elevated glucose of 128.  No other metabolic disturbance.  9:00 p.m.- ultrasound of pelvis reveals IUD appears in appropriate position.  There are no significant abnormalities.  Ovaries are normal in size.  There is a small cyst or dominant follicle visualized within each over which are within normal limits for patient's age. No adnexal abnormalities.  Normal arterial and venous blood flow to bilateral ovaries.  No significant free fluid.  Given unclear etiology of patient's pain, will obtain CT abdomen for further workup.  CT abdomen pelvis reveals IUD  present with 2.3 cm left adnexal cyst.  No findings of diverticulitis.  No pelvic free fluid.  Possibly that left adnexal cyst is causing patient's pain. Given the patient's pain was controlled, no findings of ruptured cyst or free fluid I do believe outpatient follow-up is appropriate.  Will send home with high-dose anti-inflammatory.  Patient is advised to follow up with her gynecologist as needed given strict return precautions.  She has no other complaints this time is stable for discharge.  Case was discussed with attending physician who agrees to treatment and plan.  This note was created using [employer]ict[employer]. There may be typographical errors secondary to dictation.                       Clinical Impression:     1. Left lower quadrant pain    2. Adnexal cyst                               Nguyễn Blue PA-C  12/20/18 [id number]

## 2018-12-21 NOTE — ED TRIAGE NOTES
Pt arrived to ED with c/o LLQ pain that radiates to back x1 week. Pt denies n/v, sob, cp, dysuria, fever, chills, vaginal bleeding/discharge.

## 2019-07-25 ENCOUNTER — HOSPITAL ENCOUNTER (EMERGENCY)
Facility: OTHER | Age: 24
Discharge: HOME | End: 2019-07-25
Attending: EMERGENCY MEDICINE
Payer: MEDICAID

## 2019-07-25 VITALS
HEART RATE: 95 BPM | DIASTOLIC BLOOD PRESSURE: 69 MMHG | WEIGHT: 293 LBS | HEIGHT: 66 IN | RESPIRATION RATE: 18 BRPM | TEMPERATURE: 98 F | BODY MASS INDEX: 47.09 KG/M2 | SYSTOLIC BLOOD PRESSURE: 134 MMHG | OXYGEN SATURATION: 96 %

## 2019-07-25 DIAGNOSIS — R07.9 CHEST PAIN: ICD-10-CM

## 2019-07-25 DIAGNOSIS — R42 DIZZINESS: ICD-10-CM

## 2019-07-25 DIAGNOSIS — Z77.29 CARBON MONOXIDE EXPOSURE: Primary | ICD-10-CM

## 2019-07-25 LAB
B-HCG UR QL: NEGATIVE
CTP QC/QA: YES

## 2019-07-25 PROCEDURE — 99284 EMERGENCY DEPT VISIT MOD MDM: CPT | Mod: 25

## 2019-07-25 PROCEDURE — 81025 URINE PREGNANCY TEST: CPT | Performed by: NURSE PRACTITIONER

## 2019-07-25 PROCEDURE — 93005 ELECTROCARDIOGRAM TRACING: CPT

## 2019-07-25 PROCEDURE — 93010 ELECTROCARDIOGRAM REPORT: CPT | Mod: ,,, | Performed by: INTERNAL MEDICINE

## 2019-07-25 PROCEDURE — 25000003 PHARM REV CODE 250: Performed by: NURSE PRACTITIONER

## 2019-07-25 PROCEDURE — 93010 EKG 12-LEAD: ICD-10-PCS | Mod: ,,, | Performed by: INTERNAL MEDICINE

## 2019-07-25 RX ORDER — ONDANSETRON 4 MG/1
4 TABLET, FILM COATED ORAL EVERY 6 HOURS
Qty: 12 TABLET | Refills: 0 | Status: SHIPPED | OUTPATIENT
Start: 2019-07-25

## 2019-07-25 RX ORDER — ONDANSETRON 4 MG/1
4 TABLET, ORALLY DISINTEGRATING ORAL
Status: COMPLETED | OUTPATIENT
Start: 2019-07-25 | End: 2019-07-25

## 2019-07-25 RX ADMIN — ONDANSETRON 4 MG: 4 TABLET, ORALLY DISINTEGRATING ORAL at 09:07

## 2019-07-26 NOTE — DISCHARGE INSTRUCTIONS
Thank you for allowing me to care for you today.  I hope our treatment plan will make you feel better in the next few days.  In order for me to take better care of my future patients and improve our Emergency Department, I would appreciate if you can provide us with feedback.  In the next few days, you may receive a survey in the mail.  If you do, it would mean a great deal to me if you would please take the time to complete it.    Thank you and I hope you feel better.  Cydney Johnston NP
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
## 2020-02-04 ENCOUNTER — HOSPITAL ENCOUNTER (EMERGENCY)
Facility: HOSPITAL | Age: 25
Discharge: HOME | End: 2020-02-04
Attending: EMERGENCY MEDICINE
Payer: MEDICAID

## 2020-02-04 VITALS
OXYGEN SATURATION: 98 % | WEIGHT: 293 LBS | BODY MASS INDEX: 50.02 KG/M2 | DIASTOLIC BLOOD PRESSURE: 72 MMHG | RESPIRATION RATE: 16 BRPM | HEART RATE: 82 BPM | SYSTOLIC BLOOD PRESSURE: 136 MMHG | TEMPERATURE: 98 F | HEIGHT: 64 IN

## 2020-02-04 DIAGNOSIS — R07.89 CHEST TIGHTNESS: ICD-10-CM

## 2020-02-04 DIAGNOSIS — J45.901 EXACERBATION OF ASTHMA, UNSPECIFIED ASTHMA SEVERITY, UNSPECIFIED WHETHER PERSISTENT: Primary | ICD-10-CM

## 2020-02-04 DIAGNOSIS — Z34.90 PREGNANCY, UNSPECIFIED GESTATIONAL AGE: ICD-10-CM

## 2020-02-04 DIAGNOSIS — R06.02 SHORTNESS OF BREATH: ICD-10-CM

## 2020-02-04 PROBLEM — E66.01 MORBID OBESITY: Status: ACTIVE | Noted: 2017-04-27

## 2020-02-04 LAB
ANION GAP SERPL CALC-SCNC: 9 MMOL/L (ref 8–16)
B-HCG UR QL: POSITIVE
BASOPHILS # BLD AUTO: 0.03 K/UL (ref 0–0.2)
BASOPHILS NFR BLD: 0.4 % (ref 0–1.9)
BUN SERPL-MCNC: 7 MG/DL (ref 6–20)
CALCIUM SERPL-MCNC: 8.9 MG/DL (ref 8.7–10.5)
CHLORIDE SERPL-SCNC: 106 MMOL/L (ref 95–110)
CO2 SERPL-SCNC: 21 MMOL/L (ref 23–29)
CREAT SERPL-MCNC: 0.7 MG/DL (ref 0.5–1.4)
CTP QC/QA: YES
D DIMER PPP IA.FEU-MCNC: 0.3 MG/L FEU
DIFFERENTIAL METHOD: ABNORMAL
EOSINOPHIL # BLD AUTO: 0.1 K/UL (ref 0–0.5)
EOSINOPHIL NFR BLD: 1.8 % (ref 0–8)
ERYTHROCYTE [DISTWIDTH] IN BLOOD BY AUTOMATED COUNT: 13.4 % (ref 11.5–14.5)
EST. GFR  (AFRICAN AMERICAN): >60 ML/MIN/1.73 M^2
EST. GFR  (NON AFRICAN AMERICAN): >60 ML/MIN/1.73 M^2
GLUCOSE SERPL-MCNC: 105 MG/DL (ref 70–110)
HCG INTACT+B SERPL-ACNC: NORMAL MIU/ML
HCT VFR BLD AUTO: 36.2 % (ref 37–48.5)
HGB BLD-MCNC: 11.5 G/DL (ref 12–16)
IMM GRANULOCYTES # BLD AUTO: 0.03 K/UL (ref 0–0.04)
IMM GRANULOCYTES NFR BLD AUTO: 0.4 % (ref 0–0.5)
LYMPHOCYTES # BLD AUTO: 1.3 K/UL (ref 1–4.8)
LYMPHOCYTES NFR BLD: 19 % (ref 18–48)
MCH RBC QN AUTO: 28.7 PG (ref 27–31)
MCHC RBC AUTO-ENTMCNC: 31.8 G/DL (ref 32–36)
MCV RBC AUTO: 90 FL (ref 82–98)
MONOCYTES # BLD AUTO: 0.4 K/UL (ref 0.3–1)
MONOCYTES NFR BLD: 6.1 % (ref 4–15)
NEUTROPHILS # BLD AUTO: 4.9 K/UL (ref 1.8–7.7)
NEUTROPHILS NFR BLD: 72.3 % (ref 38–73)
NRBC BLD-RTO: 0 /100 WBC
PLATELET # BLD AUTO: 262 K/UL (ref 150–350)
PMV BLD AUTO: 10.1 FL (ref 9.2–12.9)
POTASSIUM SERPL-SCNC: 3.5 MMOL/L (ref 3.5–5.1)
RBC # BLD AUTO: 4.01 M/UL (ref 4–5.4)
SODIUM SERPL-SCNC: 136 MMOL/L (ref 136–145)
WBC # BLD AUTO: 6.74 K/UL (ref 3.9–12.7)

## 2020-02-04 PROCEDURE — 99284 PR EMERGENCY DEPT VISIT,LEVEL IV: ICD-10-PCS | Mod: ,,, | Performed by: EMERGENCY MEDICINE

## 2020-02-04 PROCEDURE — 25000003 PHARM REV CODE 250: Performed by: PHYSICIAN ASSISTANT

## 2020-02-04 PROCEDURE — 93005 ELECTROCARDIOGRAM TRACING: CPT

## 2020-02-04 PROCEDURE — 85025 COMPLETE CBC W/AUTO DIFF WBC: CPT

## 2020-02-04 PROCEDURE — 94640 AIRWAY INHALATION TREATMENT: CPT

## 2020-02-04 PROCEDURE — 93010 ELECTROCARDIOGRAM REPORT: CPT | Mod: ,,, | Performed by: INTERNAL MEDICINE

## 2020-02-04 PROCEDURE — 63600175 PHARM REV CODE 636 W HCPCS: Performed by: PHYSICIAN ASSISTANT

## 2020-02-04 PROCEDURE — 99284 EMERGENCY DEPT VISIT MOD MDM: CPT | Mod: ,,, | Performed by: EMERGENCY MEDICINE

## 2020-02-04 PROCEDURE — 84702 CHORIONIC GONADOTROPIN TEST: CPT

## 2020-02-04 PROCEDURE — 99284 EMERGENCY DEPT VISIT MOD MDM: CPT | Mod: 25

## 2020-02-04 PROCEDURE — 94761 N-INVAS EAR/PLS OXIMETRY MLT: CPT

## 2020-02-04 PROCEDURE — 93010 EKG 12-LEAD: ICD-10-PCS | Mod: ,,, | Performed by: INTERNAL MEDICINE

## 2020-02-04 PROCEDURE — 85379 FIBRIN DEGRADATION QUANT: CPT

## 2020-02-04 PROCEDURE — 81025 URINE PREGNANCY TEST: CPT | Performed by: PHYSICIAN ASSISTANT

## 2020-02-04 PROCEDURE — 80048 BASIC METABOLIC PNL TOTAL CA: CPT

## 2020-02-04 PROCEDURE — 25000242 PHARM REV CODE 250 ALT 637 W/ HCPCS: Performed by: PHYSICIAN ASSISTANT

## 2020-02-04 RX ORDER — PREDNISONE 20 MG/1
40 TABLET ORAL
Status: COMPLETED | OUTPATIENT
Start: 2020-02-04 | End: 2020-02-04

## 2020-02-04 RX ORDER — NORGESTIMATE AND ETHINYL ESTRADIOL 0.25-0.035
1 KIT ORAL DAILY
COMMUNITY

## 2020-02-04 RX ORDER — ALBUTEROL SULFATE 0.83 MG/ML
SOLUTION RESPIRATORY (INHALATION)
Qty: 1 BOX | Refills: 3 | Status: SHIPPED | OUTPATIENT
Start: 2020-02-04

## 2020-02-04 RX ORDER — PREDNISONE 20 MG/1
40 TABLET ORAL DAILY
Qty: 10 TABLET | Refills: 0 | Status: SHIPPED | OUTPATIENT
Start: 2020-02-05 | End: 2020-02-10

## 2020-02-04 RX ORDER — BENZONATATE 100 MG/1
100 CAPSULE ORAL
Status: COMPLETED | OUTPATIENT
Start: 2020-02-04 | End: 2020-02-04

## 2020-02-04 RX ORDER — IPRATROPIUM BROMIDE AND ALBUTEROL SULFATE 2.5; .5 MG/3ML; MG/3ML
3 SOLUTION RESPIRATORY (INHALATION)
Status: COMPLETED | OUTPATIENT
Start: 2020-02-04 | End: 2020-02-04

## 2020-02-04 RX ADMIN — IPRATROPIUM BROMIDE AND ALBUTEROL SULFATE 3 ML: .5; 3 SOLUTION RESPIRATORY (INHALATION) at 10:02

## 2020-02-04 RX ADMIN — PREDNISONE 40 MG: 20 TABLET ORAL at 08:02

## 2020-02-04 RX ADMIN — BENZONATATE 100 MG: 100 CAPSULE ORAL at 09:02

## 2020-02-04 NOTE — ED PROVIDER NOTES
Encounter Date: 2/4/2020       History     Chief Complaint   Patient presents with    Asthma     chest feels tight and wheezing, inhaler not working     24-year-old female with asthma presents for chest tightness and shortness of breath which started last night.  She reports that with each deep breath her chest feels tighter.  Her symptoms are somewhat different from her usual asthma exacerbations although she is unable to identify why.  She has been using her inhaler at home without relief.  She reports associated wheezing as well as sore throat and nonproductive cough.  She denies fever/chills, leg swelling, abdominal pain or nausea/vomiting.  She is currently taking OCPs, she denies any history of DVT/PE, and no recent surgically or immobilization, no history of malignancy.        Review of patient's allergies indicates:   Allergen Reactions    Shellfish containing products      Past Medical History:   Diagnosis Date    Asthma      Past Surgical History:   Procedure Laterality Date    CHOLECYSTECTOMY      GALLBLADDER SURGERY       Family History   Problem Relation Age of Onset    Heart disease Father      Social History     Tobacco Use    Smoking status: Never Smoker    Smokeless tobacco: Never Used   Substance Use Topics    Alcohol use: No    Drug use: No     Review of Systems   Constitutional: Negative for fever.   HENT: Negative for sore throat.    Respiratory: Positive for cough, chest tightness and shortness of breath.    Cardiovascular: Negative for chest pain, palpitations and leg swelling.   Gastrointestinal: Negative for nausea.   Genitourinary: Negative for dysuria, flank pain, pelvic pain, vaginal bleeding and vaginal discharge.   Musculoskeletal: Negative for back pain.   Skin: Negative for rash.   Neurological: Negative for weakness and numbness.   Hematological: Does not bruise/bleed easily.       Physical Exam     Initial Vitals [02/04/20 0819]   BP Pulse Resp Temp SpO2   131/78 98 20  98.1 °F (36.7 °C) 98 %      MAP       --         Physical Exam    Nursing note and vitals reviewed.  Constitutional: She appears well-developed and well-nourished. She is not diaphoretic. No distress.   HENT:   Head: Normocephalic and atraumatic.   Eyes: EOM are normal. Pupils are equal, round, and reactive to light.   Neck: Normal range of motion.   Cardiovascular: Normal rate, regular rhythm, normal heart sounds and intact distal pulses. Exam reveals no gallop and no friction rub.    No murmur heard.  No lower extremity edema, erythema, tenderness or warmth   Pulmonary/Chest: Breath sounds normal. No respiratory distress. She has no wheezes. She has no rhonchi. She has no rales. She exhibits no tenderness.   Speaking in full sentences   Abdominal: Soft. Bowel sounds are normal. She exhibits no distension and no mass. There is no tenderness. There is no rebound and no guarding.   Musculoskeletal: Normal range of motion.   Neurological: She is alert and oriented to person, place, and time.   Skin: Skin is warm and dry.   Psychiatric: She has a normal mood and affect.         ED Course   Procedures  Labs Reviewed   BASIC METABOLIC PANEL - Abnormal; Notable for the following components:       Result Value    CO2 21 (*)     All other components within normal limits   CBC W/ AUTO DIFFERENTIAL - Abnormal; Notable for the following components:    Hemoglobin 11.5 (*)     Hematocrit 36.2 (*)     Mean Corpuscular Hemoglobin Conc 31.8 (*)     All other components within normal limits   POCT URINE PREGNANCY - Abnormal; Notable for the following components:    POC Preg Test, Ur Positive (*)     All other components within normal limits   D DIMER, QUANTITATIVE   HCG, QUANTITATIVE, PREGNANCY   HCG, QUANTITATIVE, PREGNANCY    Narrative:     Add on per Dr Micky Smith order# [id number]  HCG quant     EKG Readings: (Independently Interpreted)   Initial Reading: No STEMI. Previous EKG: Compared with most recent EKG Previous EKG Date:  7/25/2019. Rhythm: Normal Sinus Rhythm. Heart Rate: 73. Ectopy: No Ectopy. Conduction: Normal. ST Segments: Normal ST Segments. T Waves: Normal. Clinical Impression: Normal Sinus Rhythm     ECG Results          EKG 12-lead (In process)  Result time 02/04/20 10:19:52    In process by Interface, Lab In [facility] (02/04/20 10:19:52)                 Narrative:    Test Reason : R07.89,    Vent. Rate : 073 BPM     Atrial Rate : 073 BPM     P-R Int : 168 ms          QRS Dur : 082 ms      QT Int : 372 ms       P-R-T Axes : 038 027 029 degrees     QTc Int : 409 ms    Normal sinus rhythm  Normal ECG  When compared with ECG of 25-JUL-2019 20:40,  No significant change was found    Referred By: [email or web address]   SELF           Confirmed By:                             Imaging Results    None          Medical Decision Making:   History:   Old Medical Records: I decided to obtain old medical records.  Old Records Summarized: records from another hospital.       <> Summary of Records: Most recent ED visit for exposure to carbon monoxide  Initial Assessment:   24-year-old female presenting for chest tightness, wheezing, shortness of breath and cough.  Her vitals are normal and she appears well. Speaking in full sentences.  Lungs CTA.  Differential Diagnosis:   Asthma exacerbation  I doubt pneumonia  Low suspicion for pulmonary embolism, however given symptoms that her different from usual asthma exacerbation and that she is on OCPs, will check D-dimer  I doubt ACS given no risk factors  Independently Interpreted Test(s):   I have ordered and independently interpreted EKG Reading(s) - see prior notes  Clinical Tests:   Lab Tests: Ordered and Reviewed  Medical Tests: Ordered and Reviewed  ED Management:  Will check labs, give duo nebs, prednisone and reassess.    Urine pregnancy is positive. The patient was unaware that she was pregnant.  She states that she had some vaginal spotting up until 2 days ago.  She has spotting with her.  Since  she is on OCPs.  Will defer chest x-ray given low clinical suspicion for pneumonia and potential harm to fetus.  Will check beta HCG.  Given that she has no abdominal pain, not currently having any vaginal bleeding and no weakness or syncope, I do not feel that she requires an emergent ultrasound to evaluate for ectopic pregnancy.    Labs notable for beta HCG 70,325. Otherwise unremarkable. The patient reports complete relief of her shortness of breath after duo nebs.  I did discuss with her that use of D-dimer is not validated in pregnancy and return to the ED if she has any worsening shortness of breath. Given that her symptoms have resolved with duo nebs and prednisone, I believe that her symptoms are due to an asthma exacerbation.  Will discharge with short course of prednisone and albuterol.  The patient has an appointment with her PCP next week, I advised her to also follow up with Ob/gyn  Stressed the importance of follow-up, strict ED return precautions given.  Patient voiced understanding and is comfortable with discharge. I discussed this patient with my supervising physician.        Additional MDM:   PERC Rule:   Age is greater than or equal to 50 = 0.0  Heart Rate is greater than or equal to 100 = 0.0  SaO2 on room air < 95% = 0.0  Unilateral leg swelling = 0.0  Hemoptysis = 0.0  Recent surgery or trauma = 0.0  Prior PE or DVT =  0.0  Hormone use = 1.0  PERC Score = 1    Well's Criteria Score:  -Clinical symptoms of DVT (leg swelling, pain with palpation) = 0.0  -Other diagnosis less likely than pulmonary embolism =            0.0  -Heart Rate >100 =   0.0  -Immobilization (= or > than 3 days) or surgery in the previous 4 weeks = 0.0  -Previous DVT/PE = 0.0  -Hemoptysis =          0.0  -Malignancy =           0.0  Well's Probability Score =    0                 Attending Attestation:     Physician Attestation Statement for NP/PA:   I have conducted a face to face encounter with this patient in addition  to the NP/PA, due to Medical Complexity    Other NP/PA Attestation Additions:    History of Present Illness: sob                   ED Course as of Feb 04 1511   Tue Feb 04, 2020   0944 Pregnancy test positive.  I discussed this result with the patient.  Will defer chest x-ray at this time.   POCT urine pregnancy(!) [CC]      ED Course User Index  [CC] Karli Trujillo PA-C                Clinical Impression:       ICD-10-CM ICD-9-CM   1. Exacerbation of asthma, unspecified asthma severity, unspecified whether persistent J45.901 493.92   2. Chest tightness R07.89 786.59   3. Shortness of breath R06.02 786.05   4. Pregnancy, unspecified gestational age Z34.90 V22.2         Disposition:   Disposition: Discharged  Condition: Stable                     Karli Trujillo PA-C  02/04/20 1455       Micky Smith III, MD  02/04/20 5885

## 2020-02-04 NOTE — DISCHARGE INSTRUCTIONS
I am prescribing a short course of steroids for your asthma.  Please schedule an appointment with your primary care doctor regarding your asthma.  Pregnancy test today was positive. Since your periods are regular, I am unsure how far along this pregnancy is.  Please schedule an appointment with OB doctor for follow-up.    If you start to have any worsening shortness of breath, chest pain or other symptoms such as high fever, please return to emergency department.

## 2020-02-04 NOTE — ED NOTES
The patient came to the ER today with c/o chest pain and sob that began last night. The patient states her symptoms could be due to her asthma. Last used her inhaler at 4am, reports mild relief. C/o chest tightness when she breathes

## 2021-04-16 ENCOUNTER — PATIENT MESSAGE (OUTPATIENT)
Dept: RESEARCH | Facility: HOSPITAL | Age: 26
End: 2021-04-16

## 2021-07-01 ENCOUNTER — PATIENT MESSAGE (OUTPATIENT)
Dept: ADMINISTRATIVE | Facility: OTHER | Age: 26
End: 2021-07-01

## 2021-12-17 ENCOUNTER — OFFICE VISIT (OUTPATIENT)
Dept: URGENT CARE | Facility: CLINIC | Age: 26
End: 2021-12-17
Payer: MEDICAID

## 2021-12-17 VITALS
TEMPERATURE: 98 F | RESPIRATION RATE: 16 BRPM | WEIGHT: 293 LBS | HEART RATE: 87 BPM | HEIGHT: 64 IN | BODY MASS INDEX: 50.02 KG/M2 | OXYGEN SATURATION: 97 % | DIASTOLIC BLOOD PRESSURE: 82 MMHG | SYSTOLIC BLOOD PRESSURE: 120 MMHG

## 2021-12-17 DIAGNOSIS — E66.01 MORBID OBESITY: ICD-10-CM

## 2021-12-17 DIAGNOSIS — U07.1 COVID-19 VIRUS INFECTION: ICD-10-CM

## 2021-12-17 DIAGNOSIS — J45.21 MILD INTERMITTENT ASTHMA WITH ACUTE EXACERBATION: Primary | ICD-10-CM

## 2021-12-17 DIAGNOSIS — R05.9 COUGH: ICD-10-CM

## 2021-12-17 LAB
CTP QC/QA: YES
SARS-COV-2 RDRP RESP QL NAA+PROBE: POSITIVE

## 2021-12-17 PROCEDURE — 99214 OFFICE O/P EST MOD 30 MIN: CPT | Mod: S$GLB,,, | Performed by: SURGERY

## 2021-12-17 PROCEDURE — U0002 COVID-19 LAB TEST NON-CDC: HCPCS | Mod: QW,S$GLB,, | Performed by: SURGERY

## 2021-12-17 PROCEDURE — U0002: ICD-10-PCS | Mod: QW,S$GLB,, | Performed by: SURGERY

## 2021-12-17 PROCEDURE — 99214 PR OFFICE/OUTPT VISIT, EST, LEVL IV, 30-39 MIN: ICD-10-PCS | Mod: S$GLB,,, | Performed by: SURGERY

## 2021-12-17 RX ORDER — PREDNISONE 20 MG/1
40 TABLET ORAL DAILY
Qty: 10 TABLET | Refills: 0 | Status: SHIPPED | OUTPATIENT
Start: 2021-12-18 | End: 2021-12-23

## 2021-12-17 RX ORDER — DEXAMETHASONE SODIUM PHOSPHATE 100 MG/10ML
10 INJECTION INTRAMUSCULAR; INTRAVENOUS ONCE
Status: COMPLETED | OUTPATIENT
Start: 2021-12-17 | End: 2021-12-17

## 2021-12-17 RX ADMIN — DEXAMETHASONE SODIUM PHOSPHATE 10 MG: 100 INJECTION INTRAMUSCULAR; INTRAVENOUS at 08:12

## 2023-12-27 ENCOUNTER — HOSPITAL ENCOUNTER (EMERGENCY)
Facility: OTHER | Age: 28
Discharge: HOME | End: 2023-12-27
Attending: EMERGENCY MEDICINE
Payer: MEDICAID

## 2023-12-27 VITALS
DIASTOLIC BLOOD PRESSURE: 66 MMHG | RESPIRATION RATE: 16 BRPM | WEIGHT: 293 LBS | HEIGHT: 64 IN | SYSTOLIC BLOOD PRESSURE: 105 MMHG | TEMPERATURE: 98 F | HEART RATE: 63 BPM | BODY MASS INDEX: 50.02 KG/M2 | OXYGEN SATURATION: 98 %

## 2023-12-27 DIAGNOSIS — N76.0 BACTERIAL VAGINOSIS: ICD-10-CM

## 2023-12-27 DIAGNOSIS — R10.2 VAGINAL PAIN: ICD-10-CM

## 2023-12-27 DIAGNOSIS — R10.2 PELVIC CRAMPING: ICD-10-CM

## 2023-12-27 DIAGNOSIS — N93.9 VAGINAL SPOTTING: ICD-10-CM

## 2023-12-27 DIAGNOSIS — B96.89 BACTERIAL VAGINOSIS: ICD-10-CM

## 2023-12-27 DIAGNOSIS — T83.32XA DISPLACEMENT OF INTRAUTERINE CONTRACEPTIVE DEVICE, INITIAL ENCOUNTER: Primary | ICD-10-CM

## 2023-12-27 LAB
B-HCG UR QL: NEGATIVE
BACTERIA #/AREA URNS HPF: NORMAL /HPF
BACTERIA GENITAL QL WET PREP: ABNORMAL
BILIRUB UR QL STRIP: NEGATIVE
CLARITY UR: CLEAR
CLUE CELLS VAG QL WET PREP: ABNORMAL
COLOR UR: YELLOW
CTP QC/QA: YES
FILAMENT FUNGI VAG WET PREP-#/AREA: ABNORMAL
GLUCOSE UR QL STRIP: NEGATIVE
HGB UR QL STRIP: NEGATIVE
KETONES UR QL STRIP: NEGATIVE
LEUKOCYTE ESTERASE UR QL STRIP: ABNORMAL
MICROSCOPIC COMMENT: NORMAL
NITRITE UR QL STRIP: NEGATIVE
PH UR STRIP: 6 [PH] (ref 5–8)
PROT UR QL STRIP: NEGATIVE
RBC #/AREA URNS HPF: 0 /HPF (ref 0–4)
SP GR UR STRIP: 1.01 (ref 1–1.03)
SPECIMEN SOURCE: ABNORMAL
SQUAMOUS #/AREA URNS HPF: 2 /HPF
T VAGINALIS GENITAL QL WET PREP: ABNORMAL
URN SPEC COLLECT METH UR: ABNORMAL
UROBILINOGEN UR STRIP-ACNC: NEGATIVE EU/DL
WBC #/AREA URNS HPF: 5 /HPF (ref 0–5)
WBC #/AREA VAG WET PREP: ABNORMAL
YEAST GENITAL QL WET PREP: ABNORMAL

## 2023-12-27 PROCEDURE — 96372 THER/PROPH/DIAG INJ SC/IM: CPT

## 2023-12-27 PROCEDURE — 87491 CHLMYD TRACH DNA AMP PROBE: CPT

## 2023-12-27 PROCEDURE — 25000003 PHARM REV CODE 250

## 2023-12-27 PROCEDURE — 99285 EMERGENCY DEPT VISIT HI MDM: CPT | Mod: 25

## 2023-12-27 PROCEDURE — 99283 EMERGENCY DEPT VISIT LOW MDM: CPT | Mod: ,,, | Performed by: OBSTETRICS & GYNECOLOGY

## 2023-12-27 PROCEDURE — 63600175 PHARM REV CODE 636 W HCPCS

## 2023-12-27 PROCEDURE — 81000 URINALYSIS NONAUTO W/SCOPE: CPT | Performed by: EMERGENCY MEDICINE

## 2023-12-27 PROCEDURE — 81025 URINE PREGNANCY TEST: CPT | Performed by: PHYSICIAN ASSISTANT

## 2023-12-27 PROCEDURE — 87210 SMEAR WET MOUNT SALINE/INK: CPT

## 2023-12-27 PROCEDURE — 58301 REMOVE INTRAUTERINE DEVICE: CPT

## 2023-12-27 RX ORDER — DROSPIRENONE 4 MG/1
4 TABLET, FILM COATED ORAL DAILY
Qty: 30 TABLET | Refills: 2 | Status: SHIPPED | OUTPATIENT
Start: 2023-12-27 | End: 2024-03-26

## 2023-12-27 RX ORDER — KETOROLAC TROMETHAMINE 30 MG/ML
10 INJECTION, SOLUTION INTRAMUSCULAR; INTRAVENOUS
Status: COMPLETED | OUTPATIENT
Start: 2023-12-27 | End: 2023-12-27

## 2023-12-27 RX ORDER — ACETAMINOPHEN 500 MG
1000 TABLET ORAL
Status: COMPLETED | OUTPATIENT
Start: 2023-12-27 | End: 2023-12-27

## 2023-12-27 RX ORDER — METRONIDAZOLE 500 MG/1
500 TABLET ORAL 2 TIMES DAILY
Qty: 14 TABLET | Refills: 0 | Status: SHIPPED | OUTPATIENT
Start: 2023-12-27 | End: 2024-01-03

## 2023-12-27 RX ORDER — KETOROLAC TROMETHAMINE 30 MG/ML
15 INJECTION, SOLUTION INTRAMUSCULAR; INTRAVENOUS
Status: COMPLETED | OUTPATIENT
Start: 2023-12-27 | End: 2023-12-27

## 2023-12-27 RX ORDER — IBUPROFEN 600 MG/1
600 TABLET ORAL EVERY 4 HOURS PRN
Qty: 30 TABLET | Refills: 1 | Status: SHIPPED | OUTPATIENT
Start: 2023-12-27

## 2023-12-27 RX ADMIN — ACETAMINOPHEN 1000 MG: 500 TABLET ORAL at 06:12

## 2023-12-27 RX ADMIN — KETOROLAC TROMETHAMINE 15 MG: 30 INJECTION, SOLUTION INTRAMUSCULAR at 02:12

## 2023-12-27 RX ADMIN — KETOROLAC TROMETHAMINE 10 MG: 30 INJECTION, SOLUTION INTRAMUSCULAR at 06:12

## 2023-12-27 NOTE — DISCHARGE INSTRUCTIONS
Please start and complete Flagyl for BV. Do not consume alcohol while you are taking Flagyl; it can make you projectile vomit. Follow up with OB regarding further management of IUD.

## 2023-12-27 NOTE — FIRST PROVIDER EVALUATION
Emergency Department TeleTriage Encounter Note      CHIEF COMPLAINT    Chief Complaint   Patient presents with    Vaginal Bleeding     Bright red spotting that started yesterday w/ pressure on the left side  Attempted soaking in warm w/ otc meds attempted but no relief       VITAL SIGNS   Initial Vitals [12/27/23 1320]   BP Pulse Resp Temp SpO2   137/87 78 17 98.2 °F (36.8 °C) 97 %      MAP       --            ALLERGIES    Review of patient's allergies indicates:   Allergen Reactions    Shellfish containing products Hives and Swelling       PROVIDER TRIAGE NOTE  Patient presents with left sided vaginal pain and swelling. Reports history of abscess or cyst. Also reports vaginal spotting.       ORDERS  Labs Reviewed - No data to display    ED Orders (720h ago, onward)      None              Virtual Visit Note: The provider triage portion of this emergency department evaluation and documentation was performed via [employer], a HIPAA-compliant telemedicine application, in concert with a tele-presenter in the room. A face to face patient evaluation with one of my colleagues will occur once the patient is placed in an emergency department room.      DISCLAIMER: This note was prepared with [employer] voice recognition transcription software. Garbled syntax, mangled pronouns, and other bizarre constructions may be attributed to that software system.

## 2023-12-27 NOTE — ED TRIAGE NOTES
Pt presents to ED today c/o LLQ pain and vaginal bleeding onset yesterday   Pt reports she has IUD and just had blood work done for bariatric surgery , reports she is not pregnancy   Denies N/V/D or constipation

## 2023-12-27 NOTE — ED PROVIDER NOTES
"Encounter Date: 12/27/2023       History     Chief Complaint   Patient presents with    Vaginal Bleeding     Bright red spotting that started yesterday w/ pressure on the left side  Attempted soaking in warm w/ otc meds attempted but no relief     Chapo Ritchie is a 28 y.o. female presenting to the emergency department for evaluation of vaginal spotting and vaginal pain that began yesterday. She reports "bright red spotting" and a pressure on the left side of her vagina. Reports the pressure is exacerbated with inspirations. She also reports intermittent pelvic cramping that began yesterday. She does have a Mirena IUD, which was placed in October 2020. She is requesting pelvic exam and ultrasound to see if her IUD is in place. She has tried soaking in warm bath and Tylenol, last taken around 10 am today, which has not provided relief of her symptoms. She denies fever, chills, abdominal pain, nausea, vomiting, diarrhea, constipation, blood in stool, urinary symptoms, or abnormal vaginal discharge. She denies concerns for STD. States she is scheduled for bariatric surgery at [facility] on 12/29/23. She notes her last menstrual cycle was at the beginning of December.       The history is provided by the patient.     Review of patient's allergies indicates:   Allergen Reactions    Shellfish containing products Hives and Swelling     Past Medical History:   Diagnosis Date    Asthma      Past Surgical History:   Procedure Laterality Date    CHOLECYSTECTOMY      GALLBLADDER SURGERY       Family History   Problem Relation Age of Onset    Heart disease Father      Social History     Tobacco Use    Smoking status: Never    Smokeless tobacco: Never   Substance Use Topics    Alcohol use: No    Drug use: No     Review of Systems   Constitutional:  Negative for chills and fever.   HENT:  Negative for congestion, rhinorrhea and sore throat.    Respiratory:  Negative for cough and shortness of breath.  "   Cardiovascular:  Negative for chest pain.   Gastrointestinal:  Negative for abdominal pain, diarrhea, nausea and vomiting.   Genitourinary:  Positive for vaginal bleeding and vaginal pain. Negative for dysuria, frequency, genital sores, hematuria, urgency and vaginal discharge.   Musculoskeletal:  Negative for back pain.   Skin:  Negative for rash.   Neurological:  Negative for dizziness and headaches.   Psychiatric/Behavioral:  Negative for confusion.        Physical Exam     Initial Vitals [12/27/23 1320]   BP Pulse Resp Temp SpO2   137/87 78 17 98.2 °F (36.8 °C) 97 %      MAP       --         Physical Exam    Nursing note and vitals reviewed.  Constitutional: She appears well-developed and well-nourished. No distress.   Obese.    HENT:   Head: Normocephalic and atraumatic.   Nose: Nose normal.   Mouth/Throat: Oropharynx is clear and moist.   Eyes: Conjunctivae and EOM are normal.   Neck: Neck supple.   Normal range of motion.  Cardiovascular:  Normal rate, regular rhythm, normal heart sounds and intact distal pulses.           Pulmonary/Chest: Breath sounds normal. No respiratory distress. She has no wheezes. She has no rhonchi. She has no rales.   Abdominal: Abdomen is soft. Bowel sounds are normal. She exhibits no distension and no mass. There is no abdominal tenderness.   Obese abdomen.  There is no rebound and no guarding.   Genitourinary:    Vaginal discharge present.      Genitourinary Comments: Pelvic exam: female nurse present. Normal external female genitalia. Able to visualize only 1 IUD string. White vaginal discharge at cervical os.      Musculoskeletal:         General: Normal range of motion.      Cervical back: Normal range of motion and neck supple.     Neurological: She is alert and oriented to person, place, and time. She has normal strength.   Skin: Skin is warm and dry.   Psychiatric: She has a normal mood and affect. Her behavior is normal. Judgment and thought content normal.         ED  Course   Lac [facility]air    Date/Time: 12/27/2023 3:31 PM    Performed by: Richard Godfrey PA-C  Authorized by: Richard Godfrey PA-C      Labs Reviewed   VAGINAL SCREEN - Abnormal; Notable for the following components:       Result Value    Clue Cells Moderate (*)     WBC - Vaginal Screen Moderate (*)     Bacteria - Vaginal Screen Few (*)     All other components within normal limits    Narrative:     Release to patient->Immediate   C. TRACHOMATIS/N. GONORRHOEAE BY AMP DNA - Abnormal; Notable for the following components:    N gonorrhoeae, amplified DNA Detected (*)     All other components within normal limits    Narrative:     Sources by Resulting Lab:->[facility]  Release to patient->Immediate   URINALYSIS, REFLEX TO URINE CULTURE - Abnormal; Notable for the following components:    Leukocytes, UA 2+ (*)     All other components within normal limits    Narrative:     Specimen Source->Urine   URINALYSIS MICROSCOPIC    Narrative:     Specimen Source->Urine   POCT URINE PREGNANCY          Imaging Results              US Transvaginal Non OB (Final result)  Result time 12/27/23 16:30:04   Procedure changed from US Pelvis Comp with Transvag NON-OB (xpd     Final result by Atilio Herman MD (12/27/23 16:30:04)                   Impression:      1. Intrauterine device appears low lying in the lower uterine segment and could possibly protrude at the cervix.  Recommend correlation with physical exam and follow-up.  2. Echogenic debris incidentally noted in the urinary bladder incompletely visualized.  Recommend clinical correlation.      Electronically signed by: Atilio Herman  Date:    12/27/2023  Time:    16:30               Narrative:    EXAMINATION:  US TRANSVAGINAL NON OB    CLINICAL HISTORY:  IUD complication, vaginal bleeding and pain, pelvic cramping;    TECHNIQUE:  Pelvic ultrasound.  Transvaginal only pelvic images are submitted.  Lack of transabdominal component may diminish the  sensitivity.    COMPARISON:  12/20/2018    FINDINGS:  The uterus measures 8.6 x 3.8 x 5.4 cm.  No uterine mass is detected.    Endometrial stripe measures 3 mm.    Intrauterine device appears low lying in the lower uterine segment and could possibly protrude at the cervix.  Recommend correlation with physical exam and follow-up.    Right ovary measures 2.6 x 1.6 x 1.8 cm.  The left ovary measures 3.1 x 1.4 x 1.7 cm.    Few small subcentimeter follicles in the left ovary and 1.1 cm small left ovarian cyst.    Echogenic debris incidentally noted in the urinary bladder incompletely visualized.  Recommend clinical correlation.    Small nabothian cyst in the cervix.    No free fluid in the pelvis.    No adnexal mass.                                       Medications   ketorolac injection 15 mg (15 mg Intramuscular Given 12/27/23 1432)   ketorolac injection 9.999 mg (9.999 mg Intramuscular Given 12/27/23 1802)   acetaminophen tablet 1,000 mg (1,000 mg Oral Given 12/27/23 1802)     Medical Decision Making  Amount and/or Complexity of Data Reviewed  Labs: ordered.  Radiology: ordered.    Risk  OTC drugs.  Prescription drug management.                          Medical Decision Making:   Initial Assessment:   Urgent evaluation of 28 year old female with Mirena IUD since October 2020 presenting with vaginal pain, vaginal spotting, and intermittent pelvic cramping that began yesterday. No relief with Tylenol or soaking in a warm bath. No fever, chills, nausea, vomiting, diarrhea, constipation, blood in stool, urinary symptoms, or abnormal vaginal discharge. Scheduled for bariatric surgery on 12/29/23. On exam, she is well appearing and non toxic. Hemodynamically stable. Afebrile in the ED. No focal abd or pelvic ttp. Only able to visualize 1 IUD string on pelvic exam. White vaginal discharge with fishy odor present on exam. Will check UPT, UA, vaginal screen and GC. Will order US.   Clinical Tests:   Lab Tests: Ordered and  Reviewed  Radiological Study: Ordered and Reviewed  ED Management:  UPT is negative.  2+ leukocytes and no nitrites on UA.  Only 5 white blood cells and 2 squamous epithelial cells on microscopy.  Results not consistent with UTI.  Moderate amount of clue cells on vaginal screen.  GC results are pending.  On review of ultrasound, IUD appears to be low lying.  I updated the patient on all results.  Case was discussed with OB resident who will come and see the patient in the ED.    Per OB resident, IUD was removed in the ED today.  OB ordered contraceptive medication for the patient in the meantime.  OB has established her for follow-up care.  Patient was given a dose of Toradol prior to discharge after she began experiencing pain from IUD removal.  Discharge home with a course of Flagyl to treat BV.  Patient verbalized understanding and agreement with this plan of care. She was given specific return precautions. Advised to follow up with PCP as needed. All questions and concerns addressed. She is stable for discharge.     This note was created with [employer] Fluency Direct Dictation. Please excuse any spelling or grammatical errors.             Clinical Impression:  Final diagnoses:  [N93.9] Vaginal spotting  [R10.2] Vaginal pain  [R10.2] Pelvic cramping  [T83.32XA] Displacement of intrauterine contraceptive device, initial encounter (Primary)  [N76.0, B96.89] Bacterial vaginosis          ED Disposition Condition    Discharge Stable          ED Prescriptions       Medication Sig Dispense Start Date End Date Auth. Provider    ibuprofen (ADVIL,MOTRIN) 600 MG tablet Take 1 tablet (600 mg total) by mouth every 4 (four) hours as needed for Pain. 30 tablet 12/27/2023 -- Francesca Doshi MD    drospirenone, contraceptive, (SLYND) 4 mg (28) Tab Take 1 tablet (4 mg total) by mouth once daily. 30 tablet 12/27/2023 3/26/2024 Francesca Doshi MD    metroNIDAZOLE (FLAGYL) 500 MG tablet Take 1 tablet (500 mg total) by mouth 2  (two) times a day. for 7 days 14 tablet 12/27/2023 1/3/2024 Richard Godfrey PA-C          Follow-up Information    None          Richard Godfrey PA-C  12/29/23 1644       Richard Godfrey PA-C  01/18/24 2211       Richard Godfrey PA-C  01/18/24 2212

## 2023-12-28 ENCOUNTER — HOSPITAL ENCOUNTER (EMERGENCY)
Facility: OTHER | Age: 28
Discharge: HOME | End: 2023-12-28
Attending: EMERGENCY MEDICINE
Payer: MEDICAID

## 2023-12-28 VITALS
HEART RATE: 88 BPM | SYSTOLIC BLOOD PRESSURE: 94 MMHG | TEMPERATURE: 98 F | WEIGHT: 293 LBS | DIASTOLIC BLOOD PRESSURE: 74 MMHG | OXYGEN SATURATION: 100 % | RESPIRATION RATE: 18 BRPM | BODY MASS INDEX: 54.07 KG/M2

## 2023-12-28 DIAGNOSIS — A54.9 GONORRHEA: Primary | ICD-10-CM

## 2023-12-28 LAB
C TRACH DNA SPEC QL NAA+PROBE: NOT DETECTED
N GONORRHOEA DNA SPEC QL NAA+PROBE: DETECTED

## 2023-12-28 PROCEDURE — 63600175 PHARM REV CODE 636 W HCPCS

## 2023-12-28 PROCEDURE — 99284 EMERGENCY DEPT VISIT MOD MDM: CPT

## 2023-12-28 PROCEDURE — 96372 THER/PROPH/DIAG INJ SC/IM: CPT

## 2023-12-28 RX ORDER — CEFTRIAXONE 500 MG/1
500 INJECTION, POWDER, FOR SOLUTION INTRAMUSCULAR; INTRAVENOUS
Status: COMPLETED | OUTPATIENT
Start: 2023-12-28 | End: 2023-12-28

## 2023-12-28 RX ADMIN — CEFTRIAXONE SODIUM 500 MG: 500 INJECTION, POWDER, FOR SOLUTION INTRAMUSCULAR; INTRAVENOUS at 04:12

## 2023-12-28 NOTE — ED TRIAGE NOTES
Pt presents to ED for IM shot. Pt reports being seen yesterday in this ED for pelvic pain and was told to come back today for IM shot after testing positive for gonorrhea. Denies any other complaints at this time. AAOx4, NAD noted

## 2023-12-28 NOTE — CONSULTS
Consult Note  Gynecology    Consult Requested By: Emergency [facility]  Reason for Consult: Low Lying IUD/ Pelvic Pain    SUBJECTIVE:     History of Present Illness:  Chapo Ritchie is a 28 y.o.  who presented to the ED for complaints of left vaginal pain and new onset vaginal spotting. Patient reports acute onset of symptoms last night that worsened with valsalva. Patient reports mirena IUD placed on . Denies any contraindications to estrogen contraceptives. Patient is having bariatric surgery on 23.     Review of patient's allergies indicates:   Allergen Reactions    Shellfish containing products Hives and Swelling     Past Medical History:   Diagnosis Date    Asthma      Past Surgical History:   Procedure Laterality Date    CHOLECYSTECTOMY      GALLBLADDER SURGERY       OB History          1    Para        Term                AB        Living             SAB        IAB        Ectopic        Multiple        Live Births                   Family History   Problem Relation Age of Onset    Heart disease Father      Social History     Socioeconomic History    Marital status: Single   Tobacco Use    Smoking status: Never    Smokeless tobacco: Never   Substance and Sexual Activity    Alcohol use: No    Drug use: No     No current facility-administered medications for this encounter.     Current Outpatient Medications   Medication Sig    albuterol (PROVENTIL) 2.5 mg /3 mL (0.083 %) nebulizer solution inhale contents of 1 vial in nebulizer every 6 hours if needed for wheezing    albuterol (PROVENTIL/VENTOLIN HFA) 90 mcg/actuation inhaler Inhale 2 puffs into the lungs every 6 (six) hours as needed for Wheezing. Rescue    drospirenone, contraceptive, (SLYND) 4 mg (28) Tab Take 1 tablet (4 mg total) by mouth once daily.    etodolac (LODINE) 500 MG tablet Take 1 tablet (500 mg total) by mouth every 12 (twelve) hours as needed (pelvic pain). (Patient not taking: Reported on 2021)    fluticasone  (FLOVENT HFA) 110 mcg/actuation inhaler Inhale 2 puffs into the lungs every 12 (twelve) hours.    ibuprofen (ADVIL,MOTRIN) 600 MG tablet Take 1 tablet (600 mg total) by mouth every 4 (four) hours as needed for Pain.    metroNIDAZOLE (FLAGYL) 500 MG tablet Take 1 tablet (500 mg total) by mouth 2 (two) times a day. for 7 days    nebulizer and compressor Berna 1 Device by Misc.(Non-Drug; Combo Route) route 4 (four) times daily as needed. (Patient not taking: Reported on 12/17/2021)    norgestimate-ethinyl estradiol (ORTHO-CYCLEN) 0.25-35 mg-mcg per tablet Take 1 tablet by mouth once daily.    ondansetron (ZOFRAN) 4 MG tablet Take 1 tablet (4 mg total) by mouth every 6 (six) hours. (Patient not taking: Reported on 12/17/2021)       Review of Systems:  Constitutional: no significant weight change, fever, fatigue  Eyes:  No vision changes  Cardiovascular: No chest pain  Respiratory: No shortness of breath or cough  Gastrointestinal: No diarrhea, bloody stool, nausea/vomiting, constipation  Genitourinary: No blood in urine, painful urination, urgency of urination, frequency of urination  Skin/Breast: No painful breasts, nipple discharge, masses  Neurological: No headache  Endocrine: No hot flushes  Psychiatric: No depression or anxiety     OBJECTIVE:     Vital Signs  Temp:  [98.2 °F (36.8 °C)] 98.2 °F (36.8 °C)  Pulse:  [63-78] 63  Resp:  [16-17] 16  SpO2:  [97 %-98 %] 98 %  BP: (105-137)/(66-87) 105/66    Physical Exam:  Gen: A&Ox3, NAD  CV: RRR  Pulm: LCTAB  Abd: Soft, non-distended, non-tender to palpation without rebound or guarding  Ext: PPP, no peripheral edema  External genitalia: WNL  Urethra and Meatus: WNL  Vagina: Moist, pink, normal ruggae, no discharge  Cervix: WNL, no CMT, IUD strings visualized at the external cervical os. IUD pulled with jose clamps.  Uterus: 6 wk size, mobile  Adnexa: No masses, no TTP     Laboratory  Recent Results (from the past 24 hour(s))   Urinalysis, Reflex to Urine Culture Urine,  Clean Catch    Collection Time: 12/27/23  2:08 PM    Specimen: Urine   Result Value Ref Range    Specimen UA Urine, Clean Catch     Color, UA Yellow Yellow, Straw, Mary[place]    Appearance, UA Clear Clear    pH, UA 6.0 5.0 - 8.0    Specific Gravity, UA 1.015 1.005 - 1.030    Protein, UA Negative Negative    Glucose, UA Negative Negative    Ketones, UA Negative Negative    Bilirubin (UA) Negative Negative    Occult Blood UA Negative Negative    Nitrite, UA Negative Negative    Urobilinogen, UA Negative <2.0 EU/dL    Leukocytes, UA 2+ (A) Negative   Urinalysis Microscopic    Collection Time: 12/27/23  2:08 PM   Result Value Ref Range    RBC, UA 0 0 - 4 /hpf    WBC, UA 5 0 - 5 /hpf    Bacteria Occasional None-Occ /hpf    Squam Epithel, UA 2 /hpf    Microscopic Comment SEE COMMENT    POCT urine pregnancy    Collection Time: 12/27/23  2:10 PM   Result Value Ref Range    POC Preg Test, Ur Negative Negative     Acceptable Yes    Vaginal Screen    Collection Time: 12/27/23  2:23 PM    Specimen: Vagina   Result Value Ref Range    Trichomonas None None    Clue Cells Moderate (A) None    Budding Yeast None None    Fungal Hyphae None None    WBC - Vaginal Screen Moderate (A) None    Bacteria - Vaginal Screen Few (A) None    Wet Prep Source Vagina      Diagnostic Results:  EXAMINATION:  US TRANSVAGINAL NON OB     CLINICAL HISTORY:  IUD complication, vaginal bleeding and pain, pelvic cramping;     TECHNIQUE:  Pelvic ultrasound.  Transvaginal only pelvic images are submitted.  Lack of transabdominal component may diminish the sensitivity.     COMPARISON:  12/20/2018     FINDINGS:  The uterus measures 8.6 x 3.8 x 5.4 cm.  No uterine mass is detected.     Endometrial stripe measures 3 mm.     Intrauterine device appears low lying in the lower uterine segment and could possibly protrude at the cervix.  Recommend correlation with physical exam and follow-up.     Right ovary measures 2.6 x 1.6 x 1.8 cm.  The left ovary  measures 3.1 x 1.4 x 1.7 cm.     Few small subcentimeter follicles in the left ovary and 1.1 cm small left ovarian cyst.     Echogenic debris incidentally noted in the urinary bladder incompletely visualized.  Recommend clinical correlation.     Small nabothian cyst in the cervix.     No free fluid in the pelvis.     No adnexal mass.     Impression:     1. Intrauterine device appears low lying in the lower uterine segment and could possibly protrude at the cervix.  Recommend correlation with physical exam and follow-up.  2. Echogenic debris incidentally noted in the urinary bladder incompletely visualized.  Recommend clinical correlation.        Electronically signed by: Atilio Morse  Date:                                            2023  Time:                                           16:30    ASSESSMENT/PLAN:     There are no hospital problems to display for this patient.      Chapo Ritchie is a 28 y.o.  who presents with malpositioned IUD    1. Malpositioned IUD:   - VSS   - Exam: benign, 2 IUD strings visualized at cervical os. Mirena IUD removed with gentle traction on IUD strings with jose clamps and noted to be completely intact.   - TVUS consistent with malpositioned IUD.   - Labs stable. GCCT in process. Vaginosis consistent with moderate clue cells and few bacteria, however patient asymptomatic.    - Discussed risks, benefits, and alternatives to contraception; patient desires replacement of IUD. Undergoing bariatric surgery next week. Discussed risks, benefits, and alternatives of estrogen containing OCPs at time time. Patient desires slynd and appointment for IUD.   - Messaged family planning clinic for IUD placement on 01/10/2024.     Francesca Doshi MD PGY-2  Obstetrics and Gynecology  [facility]

## 2023-12-28 NOTE — DISCHARGE INSTRUCTIONS
You have been treated today for your STI. Avoid sex for 1 week during your treatment. Let your other sexual partners know about the exposure. Follow up with your ob/gyn for repeat testing to ensure cure. Refer to the additional material provided for further information.

## 2023-12-28 NOTE — ED PROVIDER NOTES
Encounter Date: 12/28/2023       History     Chief Complaint   Patient presents with    IM shot     Reports post test for gonorrhea and instructed to come in for IM shot. No other symptoms at this time      This is a 28-year-old female who presents to the ED for treatment of gonorrhea.  Patient was informed of her positive gonorrhea test from her visit 2 days ago.  At that visit, she was complaining of vaginal bleeding and concern for possible IUD issues.  She reports continued vaginal discharge and irritation.  Denies fever, chills, pelvic pain, abdominal pain, N/V/D.    The history is provided by the patient.     Review of patient's allergies indicates:   Allergen Reactions    Shellfish containing products Hives and Swelling     Past Medical History:   Diagnosis Date    Asthma      Past Surgical History:   Procedure Laterality Date    CHOLECYSTECTOMY      GALLBLADDER SURGERY       Family History   Problem Relation Age of Onset    Heart disease Father      Social History     Tobacco Use    Smoking status: Never    Smokeless tobacco: Never   Substance Use Topics    Alcohol use: No    Drug use: No     Review of Systems  As per HPI above  Physical Exam     Initial Vitals [12/28/23 1639]   BP Pulse Resp Temp SpO2   130/84 93 18 97.7 °F (36.5 °C) 97 %      MAP       --         Physical Exam    Constitutional: She appears well-developed and well-nourished.  Non-toxic appearance. She does not appear ill. No distress.   HENT:   Head: Normocephalic and atraumatic.   Eyes: Conjunctivae are normal.   Neck: Neck supple.   Cardiovascular:  Normal rate and regular rhythm.           Pulmonary/Chest: Effort normal. No tachypnea. No respiratory distress.   Genitourinary:    Genitourinary Comments: Deferred     Musculoskeletal:      Cervical back: Neck supple.     Neurological: She is alert and oriented to person, place, and time.   Skin: Skin is warm, dry and intact.   Psychiatric: She has a normal mood and affect. Her speech is  normal and behavior is normal.         ED Course   Procedures  Labs Reviewed - No data to display       Imaging Results    None          Medications   cefTRIAXone injection 500 mg (has no administration in time range)     Medical Decision Making  Evaluation of a 28-year-old female who presents to the ED for definitive treatment after testing positive for gonorrhea.  Patient received IM Rocephin here in the ED. She has follow-up with her Ob/gyn already scheduled.  She was instructed to abstain from sexual intercourse for a week and to inform her partners of the infection and need to be treated.  Safe sex practices reiterated.  Patient educated on signs and symptoms to monitor for and when to return to ED. Patient verbalized understanding agrees with treatment plan. All questions and concerns addressed.       Risk  Prescription drug management.                                  Clinical Impression:  Final diagnoses:  [A54.9] Gonorrhea (Primary)          ED Disposition Condition    Discharge Stable          ED Prescriptions    None       Follow-up Information       Follow up With Specialties Details Why Contact Info    [facility] Emergency Dept Emergency Medicine  If symptoms worsen [address] [phone]             Judie Love PA-C  12/28/23 8273

## 2024-01-05 ENCOUNTER — TELEPHONE (OUTPATIENT)
Dept: OBSTETRICS AND GYNECOLOGY | Facility: CLINIC | Age: 29
End: 2024-01-05
Payer: MEDICAID

## 2024-01-05 DIAGNOSIS — Z30.8 ENCOUNTER FOR OTHER CONTRACEPTIVE MANAGEMENT: Primary | ICD-10-CM

## 2024-01-10 ENCOUNTER — OFFICE VISIT (OUTPATIENT)
Dept: OBSTETRICS AND GYNECOLOGY | Facility: CLINIC | Age: 29
End: 2024-01-10
Payer: MEDICAID

## 2024-01-10 VITALS
SYSTOLIC BLOOD PRESSURE: 124 MMHG | DIASTOLIC BLOOD PRESSURE: 76 MMHG | WEIGHT: 293 LBS | BODY MASS INDEX: 50.02 KG/M2 | HEIGHT: 64 IN

## 2024-01-10 DIAGNOSIS — Z30.8 ENCOUNTER FOR OTHER CONTRACEPTIVE MANAGEMENT: Primary | ICD-10-CM

## 2024-01-10 LAB
B-HCG UR QL: NEGATIVE
CTP QC/QA: YES

## 2024-01-10 PROCEDURE — 99999PBSHW POCT URINE PREGNANCY: Mod: PBBFAC,,,

## 2024-01-10 PROCEDURE — 58300 INSERT INTRAUTERINE DEVICE: CPT | Mod: S$PBB,,, | Performed by: OBSTETRICS & GYNECOLOGY

## 2024-01-10 PROCEDURE — 99213 OFFICE O/P EST LOW 20 MIN: CPT | Mod: PBBFAC | Performed by: STUDENT IN AN ORGANIZED HEALTH CARE EDUCATION/TRAINING PROGRAM

## 2024-01-10 PROCEDURE — 81025 URINE PREGNANCY TEST: CPT | Mod: PBBFAC | Performed by: STUDENT IN AN ORGANIZED HEALTH CARE EDUCATION/TRAINING PROGRAM

## 2024-01-10 PROCEDURE — 99499 UNLISTED E&M SERVICE: CPT | Mod: S$PBB,,, | Performed by: OBSTETRICS & GYNECOLOGY

## 2024-01-10 PROCEDURE — 99999PBSHW PR PBB SHADOW TECHNICAL ONLY FILED TO HB: Mod: PBBFAC,,,

## 2024-01-10 PROCEDURE — 58300 INSERT INTRAUTERINE DEVICE: CPT | Mod: PBBFAC | Performed by: STUDENT IN AN ORGANIZED HEALTH CARE EDUCATION/TRAINING PROGRAM

## 2024-01-10 PROCEDURE — 99999 PR PBB SHADOW E&M-EST. PATIENT-LVL III: CPT | Mod: PBBFAC,,, | Performed by: STUDENT IN AN ORGANIZED HEALTH CARE EDUCATION/TRAINING PROGRAM

## 2024-01-10 RX ADMIN — LEVONORGESTREL 1 INTRA UTERINE DEVICE: 52 INTRAUTERINE DEVICE INTRAUTERINE at 02:01

## 2024-01-10 NOTE — PROCEDURES
Insertion of IUD    Date/Time: 1/10/2024 2:00 PM    Performed by: Kaylen Campa MD  Authorized by: Kaylen Campa MD    Consent:     Consent given by:  Patient    Procedure risks and benefits discussed: yes      Patient questions answered: yes      Patient agrees, verbalizes understanding, and wants to proceed: yes     Device to be inserted was verified by patient: yes  Insertion Procedure:   1 Intra Uterine Device levonorgestreL 21 mcg/24 hours (8 yrs) 52 mg       Pelvic exam performed: yes      Negative urine pregnancy test: yes      Cervix cleaned and prepped: yes      Speculum placed in vagina: yes      Tenaculum applied to cervix: yes      Uterus sounded: yes      Uterus sound depth (cm):  6.5    IUD inserted with no complications: yes      IUD type:  Mirena    Strings trimmed: yes    Post-procedure:     Patient tolerated procedure well: yes    Comments:      Patient will perform string check at home.    Kaylen Campa M.D.  OB/GYN PGY- 4

## 2024-09-04 ENCOUNTER — OFFICE VISIT (OUTPATIENT)
Dept: URGENT CARE | Facility: CLINIC | Age: 29
End: 2024-09-04
Payer: OTHER MISCELLANEOUS

## 2024-09-04 VITALS
SYSTOLIC BLOOD PRESSURE: 117 MMHG | BODY MASS INDEX: 50.02 KG/M2 | HEART RATE: 85 BPM | WEIGHT: 293 LBS | DIASTOLIC BLOOD PRESSURE: 72 MMHG | OXYGEN SATURATION: 98 % | HEIGHT: 64 IN | RESPIRATION RATE: 20 BRPM | TEMPERATURE: 98 F

## 2024-09-04 DIAGNOSIS — S93.601A SPRAIN OF RIGHT FOOT, INITIAL ENCOUNTER: Primary | ICD-10-CM

## 2024-09-04 DIAGNOSIS — S93.401A SPRAIN OF RIGHT ANKLE, UNSPECIFIED LIGAMENT, INITIAL ENCOUNTER: ICD-10-CM

## 2024-09-04 DIAGNOSIS — Z02.6 ENCOUNTER RELATED TO WORKER'S COMPENSATION CLAIM: ICD-10-CM

## 2024-09-04 DIAGNOSIS — S99.921A INJURY OF RIGHT ANKLE AND FOOT, INITIAL ENCOUNTER: ICD-10-CM
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
## 2024-09-04 DIAGNOSIS — S99.911A INJURY OF RIGHT ANKLE AND FOOT, INITIAL ENCOUNTER: ICD-10-CM

## 2024-09-04 PROCEDURE — 99203 OFFICE O/P NEW LOW 30 MIN: CPT | Mod: S$GLB,,, | Performed by: PHYSICIAN ASSISTANT

## 2024-09-04 RX ORDER — IBUPROFEN 200 MG
800 TABLET ORAL
Status: COMPLETED | OUTPATIENT
Start: 2024-09-04 | End: 2024-09-04

## 2024-09-04 RX ORDER — IBUPROFEN 800 MG/1
800 TABLET ORAL EVERY 6 HOURS PRN
Qty: 30 TABLET | Refills: 0 | Status: SHIPPED | OUTPATIENT
Start: 2024-09-04

## 2024-09-04 RX ORDER — ACETAMINOPHEN 500 MG
1000 TABLET ORAL
Status: COMPLETED | OUTPATIENT
Start: 2024-09-04 | End: 2024-09-04

## 2024-09-04 RX ADMIN — Medication 1000 MG: at 03:09

## 2024-09-04 RX ADMIN — Medication 800 MG: at 03:09

## 2024-09-04 NOTE — LETTER
[facility] [address] [address]  Phone: [phone]  Fax: [phone]  [facility]: [id number]    Pt Name: Chapo Ritchie  Injury Date: 09/04/2024   Employee ID: [id number] Date of First Treatment: 09/04/2024   Company: [employer]      Appointment Time: 02:10 PM Arrived: 2:00 PM   Provider: Juan José Ritchie PA-C Time Out: 3:50 PM     Office Treatment:   1. Sprain of right foot, initial encounter    2. Encounter related to worker's compensation claim    3. Injury of right ankle and foot, initial encounter    4. Sprain of right ankle, unspecified ligament, initial encounter    5. Work related injury      Medications Ordered This Encounter   Medications    acetaminophen tablet 1,000 mg    ibuprofen (ADVIL,MOTRIN) 800 MG tablet    ibuprofen tablet 800 mg      Patient Instructions: Use Crutches as directed (Apply ice 2-3 times daily for 30 minutes.  Elevate the foot often.)      Restrictions: Disabled until next office visit     Return Appointment: 9/10/2024 at 8:45 AM    YAMILETH
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
## 2024-09-10 ENCOUNTER — OFFICE VISIT (OUTPATIENT)
Dept: URGENT CARE | Facility: CLINIC | Age: 29
End: 2024-09-10
Payer: OTHER MISCELLANEOUS

## 2024-09-10 VITALS
RESPIRATION RATE: 17 BRPM | WEIGHT: 293 LBS | DIASTOLIC BLOOD PRESSURE: 68 MMHG | TEMPERATURE: 98 F | SYSTOLIC BLOOD PRESSURE: 109 MMHG | HEART RATE: 74 BPM | OXYGEN SATURATION: 98 % | BODY MASS INDEX: 50.02 KG/M2 | HEIGHT: 64 IN

## 2024-09-10 DIAGNOSIS — S93.491D SPRAIN OF ANTERIOR TALOFIBULAR LIGAMENT OF RIGHT ANKLE, SUBSEQUENT ENCOUNTER: ICD-10-CM

## 2024-09-10 DIAGNOSIS — Z02.6 ENCOUNTER RELATED TO WORKER'S COMPENSATION CLAIM: ICD-10-CM

## 2024-09-10 DIAGNOSIS — S93.601D SPRAIN OF RIGHT FOOT, SUBSEQUENT ENCOUNTER: Primary | ICD-10-CM
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
## 2024-09-10 PROCEDURE — 99213 OFFICE O/P EST LOW 20 MIN: CPT | Mod: S$GLB,,, | Performed by: PHYSICIAN ASSISTANT

## 2024-09-10 NOTE — PROGRESS NOTES
Subjective:      Patient ID: Chapo Ritchie is a 29 y.o. female.    Chief Complaint: Ankle Injury (DOI 09/04/2024 Rt ankle/ Foot Jordy)    Patient's place of employment - [employer]  Patient's job title - TEACHER   Date of injury - 9/4/2024  Body part injured including left or right - R ANKLE   Current work status per last visit - Not working   Improved, same, or worse -  Mildly Improved. Pt states it hurts to walk and it is more painful at night.   Pain Scale right now (1-10) -  6  Jordy    Provider note:   Patient presents for follow-up right ankle and foot injury.  She reports improvement since last office visit.  Says she does not really need the crutches anymore.  She reports pain is worse with ambulation.  Says she has to sit down frequently and can not stand or walk for prolonged periods.  Reports pain is about the lateral aspect of the foot and ankle.  She has been applying ice and taking ibuprofen with some relief.  Says her foot and ankle throb at night and it is affecting her sleep.  Patient is currently disabled from work. MEB    Ankle Injury   Pertinent negatives include no numbness.     Constitution: Positive for activity change.   Musculoskeletal:  Positive for pain, trauma, joint pain, joint swelling and abnormal ROM of joint.   Skin:  Negative for wound and bruising.   Neurological:  Negative for numbness and tingling.     Objective:     Physical Exam  Vitals and nursing note reviewed.   Constitutional:       General: She is not in acute distress.     Appearance: She is well-developed. She is not diaphoretic.   HENT:      Head: Normocephalic and atraumatic.      Right Ear: Hearing and external ear normal.      Left Ear: Hearing and external ear normal.      Nose: Nose normal. No nasal deformity.   Eyes:      General: Lids are normal. No scleral icterus.     Conjunctiva/sclera: Conjunctivae normal.   Neck:      Trachea: Trachea normal.   Cardiovascular:      Pulses: Normal pulses.   Pulmonary:       Effort: Pulmonary effort is normal. No respiratory distress.      Breath sounds: No stridor.   Musculoskeletal:      Cervical back: Normal range of motion.      Right ankle: Swelling present. No deformity. Tenderness present over the ATF ligament and base of 5th metatarsal. No lateral malleolus, medial malleolus, CF ligament, posterior TF ligament or proximal fibula tenderness. Normal range of motion. Anterior drawer test negative. Normal pulse.      Right Achilles Tendon: No tenderness or defects.      Right foot: Decreased range of motion. Swelling and tenderness present. Normal pulse.        Feet:    Skin:     General: Skin is warm and dry.      Capillary Refill: Capillary refill takes less than 2 seconds.   Neurological:      Mental Status: She is alert. She is not disoriented.      GCS: GCS eye subscore is 4. GCS verbal subscore is 5. GCS motor subscore is 6.      Sensory: No sensory deficit.   Psychiatric:         Attention and Perception: She is attentive.         Speech: Speech normal.         Behavior: Behavior normal.        Assessment:      1. Sprain of right foot, subsequent encounter    2. Encounter related to worker's compensation claim    3. Sprain of anterior talofibular ligament of right ankle, subsequent encounter    4. Work related injury      Plan:     Patient was given a postop shoe.  Encouraged to wean off of crutches as tolerated.  Continue ice 2-3 times daily for 30 minutes.  Patient was given a handout with exercises for her ankle to conduct at home.  Continue ibuprofen up to 4 times daily as needed.  Return to clinic in 3 days.  Anticipate return to duty Monday with or without restrictions.          Restrictions: Disabled until next office visit  Follow up in about 3 days (around 9/13/2024) for Reassessment.

## 2024-09-10 NOTE — LETTER
[facility] [address] [address]  Phone: [phone]  Fax: [phone]  [facility]: [email or web address]    Pt Name: Chapo Ritchie  Injury Date: 09/04/2024   Employee ID: [id number] Date of Treatment: 09/10/2024   Company: [employer]      Appointment Time: 08:30 AM Arrived: 8:45 AM   Provider: Juan José Ritchie PA-C Time Out: 9:47 AM     Office Treatment:   1. Sprain of right foot, subsequent encounter    2. Encounter related to worker's compensation claim    3. Sprain of anterior talofibular ligament of right ankle, subsequent encounter    4. Work related injury               Restrictions: Disabled until next office visit     Return Appointment: 9/13/2024 at 11:30 AM    YAMILETH

## 2024-09-13 ENCOUNTER — OFFICE VISIT (OUTPATIENT)
Dept: URGENT CARE | Facility: CLINIC | Age: 29
End: 2024-09-13
Payer: OTHER MISCELLANEOUS

## 2024-09-13 VITALS
HEIGHT: 64 IN | TEMPERATURE: 98 F | WEIGHT: 293 LBS | BODY MASS INDEX: 50.02 KG/M2 | DIASTOLIC BLOOD PRESSURE: 70 MMHG | HEART RATE: 89 BPM | RESPIRATION RATE: 18 BRPM | OXYGEN SATURATION: 98 % | SYSTOLIC BLOOD PRESSURE: 95 MMHG

## 2024-09-13 DIAGNOSIS — Z02.6 ENCOUNTER RELATED TO WORKER'S COMPENSATION CLAIM: ICD-10-CM
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
## 2024-09-13 DIAGNOSIS — S93.601D SPRAIN OF RIGHT FOOT, SUBSEQUENT ENCOUNTER: Primary | ICD-10-CM

## 2024-09-13 DIAGNOSIS — S93.491D SPRAIN OF ANTERIOR TALOFIBULAR LIGAMENT OF RIGHT ANKLE, SUBSEQUENT ENCOUNTER: ICD-10-CM

## 2024-09-13 NOTE — PROGRESS NOTES
Subjective:      Patient ID: Chapo Ritchie is a 29 y.o. female.    Chief Complaint: Foot Pain (RT FOOT, RT ANKLE )    Patient's place of employment - [employer]  Patient's job title - TEACHER   Date of Injury - 9/4/2024  Body part injured - RT FOOT, RT ANKLE   Current work status per last visit - DISABLED   Improved, same, or worse - IMPROVED   Pain Scale right now (1-10) -  7/10    [facility]    Provider note:   Patient presents for follow-up right foot and ankle injury.  She reports some improvement since last office visit.  She is no longer requiring crutches for ambulation.  She does report significant pain with ambulation although is more tolerable now.  Patient states she has been applying ice, taking ibuprofen.  Patient has not returned to work since her injury. MEB    Foot Pain  Associated symptoms include arthralgias and joint swelling. Pertinent negatives include no numbness.     Constitution: Positive for activity change.   Musculoskeletal:  Positive for pain, trauma, joint pain, joint swelling and abnormal ROM of joint.   Skin:  Negative for wound and bruising.   Neurological:  Negative for numbness and tingling.     Objective:     Physical Exam  Vitals and nursing note reviewed.   Constitutional:       General: She is not in acute distress.     Appearance: She is well-developed. She is not diaphoretic.   HENT:      Head: Normocephalic and atraumatic.      Right Ear: Hearing and external ear normal.      Left Ear: Hearing and external ear normal.      Nose: Nose normal. No nasal deformity.   Eyes:      General: Lids are normal. No scleral icterus.     Conjunctiva/sclera: Conjunctivae normal.   Neck:      Trachea: Trachea normal.   Cardiovascular:      Pulses: Normal pulses.   Pulmonary:      Effort: Pulmonary effort is normal. No respiratory distress.      Breath sounds: No stridor.   Musculoskeletal:      Cervical back: Normal range of motion.      Right ankle: Swelling present. No deformity.  Tenderness present over the ATF ligament and base of 5th metatarsal. No lateral malleolus, medial malleolus, CF ligament, posterior TF ligament or proximal fibula tenderness. Normal range of motion. Anterior drawer test negative. Normal pulse.      Right Achilles Tendon: No tenderness or defects.      Right foot: Decreased range of motion. Swelling and tenderness present. Normal pulse.        Feet:    Skin:     General: Skin is warm and dry.      Capillary Refill: Capillary refill takes less than 2 seconds.   Neurological:      Mental Status: She is alert. She is not disoriented.      GCS: GCS eye subscore is 4. GCS verbal subscore is 5. GCS motor subscore is 6.      Sensory: No sensory deficit.   Psychiatric:         Attention and Perception: She is attentive.         Speech: Speech normal.         Behavior: Behavior normal.      Assessment:      1. Sprain of right foot, subsequent encounter    2. Encounter related to worker's compensation claim    3. Sprain of anterior talofibular ligament of right ankle, subsequent encounter    4. Work related injury      Plan:     Patient no longer on crutches.  Advised to continue postop shoe, ice, elevate and home exercises daily.  I will transition to light duty starting Monday.  Return to clinic in 1 week or sooner as needed.     Patient Instructions: Attention not to aggravate affected area   Restrictions: Sit down work only, Sedentary work only  Follow up in about 1 week (around 9/20/2024) for Reassessment.

## 2024-09-13 NOTE — LETTER
[facility] [address] [address]  Phone: [phone]  Fax: [phone]  [facility]: [id number]    Pt Name: Chapo Ritchie  Injury Date: 09/04/2024   Employee ID: [id number] Date of Treatment: 09/13/2024   Company: [employer]      Appointment Time: 12:15 PM Arrived: 12:30 PM   Provider: Juan José Ritchie PA-C Time Out: 11:18 AM     Office Treatment:   1. Sprain of right foot, subsequent encounter    2. Encounter related to worker's compensation claim    3. Sprain of anterior talofibular ligament of right ankle, subsequent encounter    4. Work related injury          Patient Instructions: Attention not to aggravate affected area      Restrictions: Sit down work only, Sedentary work only     Return Appointment: 9/20/2024 at 11:00 AM.    YAMILETH

## 2024-09-20 ENCOUNTER — OFFICE VISIT (OUTPATIENT)
Dept: URGENT CARE | Facility: CLINIC | Age: 29
End: 2024-09-20
Payer: OTHER MISCELLANEOUS

## 2024-09-20 VITALS
HEART RATE: 80 BPM | DIASTOLIC BLOOD PRESSURE: 72 MMHG | SYSTOLIC BLOOD PRESSURE: 109 MMHG | RESPIRATION RATE: 17 BRPM | WEIGHT: 293 LBS | OXYGEN SATURATION: 98 % | HEIGHT: 64 IN | BODY MASS INDEX: 50.02 KG/M2 | TEMPERATURE: 98 F

## 2024-09-20 DIAGNOSIS — Z02.6 ENCOUNTER RELATED TO WORKER'S COMPENSATION CLAIM: ICD-10-CM

## 2024-09-20 DIAGNOSIS — S93.601D SPRAIN OF RIGHT FOOT, SUBSEQUENT ENCOUNTER: Primary | ICD-10-CM

## 2024-09-20 DIAGNOSIS — S93.491D SPRAIN OF ANTERIOR TALOFIBULAR LIGAMENT OF RIGHT ANKLE, SUBSEQUENT ENCOUNTER: ICD-10-CM
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
## 2024-09-20 NOTE — PROGRESS NOTES
Subjective:      Patient ID: Chapo Ritchie is a 29 y.o. female.    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  Pain Scale right now (1-10) -  6/10  Jordy      Provider note:   Patient presents for follow-up right foot and ankle injury.  She reports improvement since last office visit.  She complains of pain about the lateral foot, worse with prolonged standing or walking.  Says the ankle seems to be getting better.  She reports intermittent swelling of the foot.  Says she is been taking ibuprofen as needed for pain. MEB    Foot Injury   Pertinent negatives include no numbness.     Constitution: Positive for activity change.   Musculoskeletal:  Positive for pain, trauma, joint pain, joint swelling and abnormal ROM of joint.   Skin:  Negative for wound and bruising.   Neurological:  Negative for numbness and tingling.     Objective:     Physical Exam  Vitals and nursing note reviewed.   Constitutional:       General: She is not in acute distress.     Appearance: She is well-developed. She is not diaphoretic.   HENT:      Head: Normocephalic and atraumatic.      Right Ear: Hearing and external ear normal.      Left Ear: Hearing and external ear normal.      Nose: Nose normal. No nasal deformity.   Eyes:      General: Lids are normal. No scleral icterus.     Conjunctiva/sclera: Conjunctivae normal.   Neck:      Trachea: Trachea normal.   Cardiovascular:      Pulses: Normal pulses.   Pulmonary:      Effort: Pulmonary effort is normal. No respiratory distress.      Breath sounds: No stridor.   Musculoskeletal:      Cervical back: Normal range of motion.      Right ankle: Swelling present. No deformity. Tenderness present  over the ATF ligament and base of 5th metatarsal. No lateral malleolus, medial malleolus, CF ligament, posterior TF ligament or proximal fibula tenderness. Normal range of motion. Anterior drawer test negative. Normal pulse.      Right Achilles Tendon: No tenderness or defects.      Right foot: Decreased range of motion. Swelling and tenderness present. Normal pulse.        Feet:    Skin:     General: Skin is warm and dry.      Capillary Refill: Capillary refill takes less than 2 seconds.   Neurological:      Mental Status: She is alert. She is not disoriented.      GCS: GCS eye subscore is 4. GCS verbal subscore is 5. GCS motor subscore is 6.      Sensory: No sensory deficit.   Psychiatric:         Attention and Perception: She is attentive.         Speech: Speech normal.         Behavior: Behavior normal.        Assessment:      1. Sprain of right foot, subsequent encounter    2. Encounter related to worker's compensation claim    3. Sprain of anterior talofibular ligament of right ankle, subsequent encounter    4. Work related injury      Plan:          Patient Instructions:  (Apply Ice 2 times daily for 30 minutes.  Elevate the foot often.  Conduct home exercises daily.)   Restrictions: Sedentary work only, Sit down work only  Follow up in about 1 week (around 9/27/2024).

## 2024-09-20 NOTE — LETTER
[facility] [address] [address]  Phone: [phone]  Fax: [phone]  [facility]: [id number]    Pt Name: Chapo Ritchie  Injury Date: 09/04/2024   Employee ID:  Date of First Treatment: 09/20/2024   Company: [employer]      Appointment Time: 10:45 AM Arrived: 10:50 am   Provider: Juan José Ritchie PA-C Time Out:11:50 am     Office Treatment:   1. Sprain of right foot, subsequent encounter    2. Encounter related to worker's compensation claim    3. Sprain of anterior talofibular ligament of right ankle, subsequent encounter    4. Work related injury          Patient Instructions:  (Apply Ice 2 times daily for 30 minutes.  Elevate the foot often.  Conduct home exercises daily.)    Restrictions: Sedentary work only, Sit down work only     Return Appointment: 9/27/2024 at 10:00 am RONNIE

## 2024-09-27 ENCOUNTER — OFFICE VISIT (OUTPATIENT)
Dept: URGENT CARE | Facility: CLINIC | Age: 29
End: 2024-09-27
Payer: OTHER MISCELLANEOUS

## 2024-09-27 VITALS
TEMPERATURE: 98 F | OXYGEN SATURATION: 99 % | SYSTOLIC BLOOD PRESSURE: 114 MMHG | RESPIRATION RATE: 17 BRPM | DIASTOLIC BLOOD PRESSURE: 75 MMHG | BODY MASS INDEX: 45.93 KG/M2 | WEIGHT: 269 LBS | HEART RATE: 76 BPM | HEIGHT: 64 IN
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
## 2024-09-27 DIAGNOSIS — Z02.6 ENCOUNTER RELATED TO WORKER'S COMPENSATION CLAIM: ICD-10-CM

## 2024-09-27 DIAGNOSIS — S93.601D SPRAIN OF RIGHT FOOT, SUBSEQUENT ENCOUNTER: Primary | ICD-10-CM

## 2024-09-27 NOTE — PROGRESS NOTES
Subjective:      Patient ID: Chapo Ritchie is a 29 y.o. female.    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  She reports improvement since last office visit.  Says she still has pain about the lateral aspect of the foot.  She reports pain is worse with ambulation.  Patient is wearing a postop shoe which helps protect the foot.  She is currently working light duty. MEB    Foot Injury   Pertinent negatives include no numbness.     Constitution: Positive for activity change.   Musculoskeletal:  Positive for pain, trauma and joint pain.   Skin:  Negative for wound and bruising.   Neurological:  Negative for numbness and tingling.     Objective:     Physical Exam  Vitals and nursing note reviewed.   Constitutional:       General: She is not in acute distress.     Appearance: She is well-developed. She is not diaphoretic.   HENT:      Head: Normocephalic and atraumatic.      Right Ear: Hearing and external ear normal.      Left Ear: Hearing and external ear normal.      Nose: Nose normal. No nasal deformity.   Eyes:      General: Lids are normal. No scleral icterus.     Conjunctiva/sclera: Conjunctivae normal.   Neck:      Trachea: Trachea normal.   Cardiovascular:      Pulses: Normal pulses.   Pulmonary:      Effort: Pulmonary effort is normal. No respiratory distress.      Breath sounds: No stridor.   Musculoskeletal:      Cervical back: Normal range of motion.      Right ankle: Swelling present. No deformity. Tenderness present over the base of 5th metatarsal. No lateral malleolus, medial malleolus, ATF ligament, CF  ligament, posterior TF ligament or proximal fibula tenderness. Normal range of motion. Anterior drawer test negative. Normal pulse.      Right Achilles Tendon: No tenderness or defects.      Right foot: Decreased range of motion. Swelling and tenderness present. Normal pulse.        Feet:       Comments: Focal tenderness over the 5th metatarsal base.    Skin:     General: Skin is warm and dry.      Capillary Refill: Capillary refill takes less than 2 seconds.   Neurological:      Mental Status: She is alert. She is not disoriented.      GCS: GCS eye subscore is 4. GCS verbal subscore is 5. GCS motor subscore is 6.      Sensory: No sensory deficit.   Psychiatric:         Attention and Perception: She is attentive.         Speech: Speech normal.         Behavior: Behavior normal.     XR FOOT COMPLETE 3 VIEW RIGHT    Result Date: 9/27/2024  EXAMINATION: XR FOOT COMPLETE 3 VIEW RIGHT CLINICAL HISTORY: . Encounter for examination for insurance purposes TECHNIQUE: AP, lateral, and oblique views of the right foot were performed. COMPARISON: Right foot September 4, 2024 FINDINGS: Bones are fairly well mineralized.  Mild narrowing at the 1st MTP joint.  Soft tissues lateral aspect of the right foot appears similar.  No fracture.  No acute fracture.     No detrimental change.  Persistent soft tissue swelling. Electronically signed by: David Roa MD Date:    09/27/2024 Time:    11:11    X-Ray Ankle Complete Right    Result Date: 9/4/2024  EXAMINATION: XR FOOT COMPLETE 3 VIEW RIGHT; XR ANKLE COMPLETE 3 VIEW RIGHT CLINICAL HISTORY: . Unspecified injury of right ankle, initial encounter TECHNIQUE: AP, lateral, and oblique views of the right foot were performed.  Three views of the ankle. COMPARISON: None FINDINGS: No fracture or dislocation.  Joint spaces are maintained.  There is mild hallux valgus with a small bunion. There is diffuse soft tissue swelling of the foot.     No fracture or dislocation. Diffuse soft tissue  swelling of the foot. Electronically signed by: Stone Pimentel Date:    09/04/2024 Time:    15:25    X-Ray Foot Complete Right    Result Date: 9/4/2024  EXAMINATION: XR FOOT COMPLETE 3 VIEW RIGHT; XR ANKLE COMPLETE 3 VIEW RIGHT CLINICAL HISTORY: . Unspecified injury of right ankle, initial encounter TECHNIQUE: AP, lateral, and oblique views of the right foot were performed.  Three views of the ankle. COMPARISON: None FINDINGS: No fracture or dislocation.  Joint spaces are maintained.  There is mild hallux valgus with a small bunion. There is diffuse soft tissue swelling of the foot.     No fracture or dislocation. Diffuse soft tissue swelling of the foot. Electronically signed by: Stone Pimentel Date:    09/04/2024 Time:    15:25             Assessment:      1. Sprain of right foot, subsequent encounter    2. Encounter related to worker's compensation claim    3. Work related injury      Plan:          Patient Instructions: Attention not to aggravate affected area, Use splint as directed   Restrictions: No Prolonged standing/walking, Sit or stand as needed  Follow up in about 2 weeks (around 10/11/2024) for Reassessment.

## 2024-09-27 NOTE — LETTER
[facility] [address] [address]  Phone: [phone]  Fax: [phone]  [facility]: [id number]    Pt Name: Chapo Ritchie  Injury Date: 09/04/2024   Employee ID: [id number] Date of Treatment: 09/27/2024   Company: [employer]      Appointment Time: 09:45 AM Arrived: 9:55 AM   Provider: Juan José Ritchie PA-C Time Out: 11:04 AM     Office Treatment:   1. Sprain of right foot, subsequent encounter    2. Encounter related to worker's compensation claim    3. Work related injury          Patient Instructions: Attention not to aggravate affected area, Use splint as directed      Restrictions: No Prolonged standing/walking, Sit or stand as needed     Return Appointment: 10/11/2024 at 10:00 AM    YAMILETH

## 2024-10-11 ENCOUNTER — OFFICE VISIT (OUTPATIENT)
Dept: URGENT CARE | Facility: CLINIC | Age: 29
End: 2024-10-11
Payer: OTHER MISCELLANEOUS

## 2024-10-11 VITALS
DIASTOLIC BLOOD PRESSURE: 73 MMHG | SYSTOLIC BLOOD PRESSURE: 111 MMHG | TEMPERATURE: 98 F | BODY MASS INDEX: 45.93 KG/M2 | HEART RATE: 67 BPM | WEIGHT: 269 LBS | RESPIRATION RATE: 18 BRPM | OXYGEN SATURATION: 98 % | HEIGHT: 64 IN

## 2024-10-11 DIAGNOSIS — Z02.6 ENCOUNTER RELATED TO WORKER'S COMPENSATION CLAIM: ICD-10-CM

## 2024-10-11 DIAGNOSIS — S93.601D SPRAIN OF RIGHT FOOT, SUBSEQUENT ENCOUNTER: Primary | ICD-10-CM
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
## 2024-10-11 NOTE — LETTER
[facility] [address] [address]  Phone: [phone]  Fax: [phone]  [facility]: [id number]    Pt Name: Chapo Ritchie  Injury Date: 09/04/2024   Employee ID:  Date of Treatment: 10/11/2024   Company: [employer]      Appointment Time: 09:45 AM Arrived: 10:05   Provider: Juan José Ritchie PA-C Time Out:10:50 am     Office Treatment:   1. Sprain of right foot, subsequent encounter    2. Encounter related to worker's compensation claim    3. Work related injury               Restrictions: Regular Duty, Discharged from Occupational Health     Return Appointment:

## 2024-10-11 NOTE — PROGRESS NOTES
"Subjective:      Patient ID: Chapo Ritchie is a 29 y.o. female.    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  She reports much improved since last office visit.  Says she has a "pulling" sensation with prolonged standing.  She continues to wear the postop shoe.  Patient has been working regular duty with no limitations. MEB    Foot Injury   Pertinent negatives include no numbness.     Constitution: Positive for activity change.   Musculoskeletal:  Negative for joint pain.   Skin:  Negative for wound and bruising.   Neurological:  Negative for numbness and tingling.     Objective:     Physical Exam  Vitals and nursing note reviewed.   Constitutional:       General: She is not in acute distress.     Appearance: She is well-developed. She is not diaphoretic.   HENT:      Head: Normocephalic and atraumatic.      Right Ear: Hearing and external ear normal.      Left Ear: Hearing and external ear normal.      Nose: Nose normal. No nasal deformity.   Eyes:      General: Lids are normal. No scleral icterus.     Conjunctiva/sclera: Conjunctivae normal.   Neck:      Trachea: Trachea normal.   Cardiovascular:      Pulses: Normal pulses.   Pulmonary:      Effort: Pulmonary effort is normal. No respiratory distress.      Breath sounds: No stridor.   Musculoskeletal:      Cervical back: Normal range of motion.      Right foot: Normal range of motion. No swelling, deformity or tenderness.   Skin:     General: Skin is warm and dry.      Capillary Refill: Capillary refill takes less than 2 seconds.   Neurological:      Mental Status: She " is alert. She is not disoriented.      GCS: GCS eye subscore is 4. GCS verbal subscore is 5. GCS motor subscore is 6.      Sensory: No sensory deficit.   Psychiatric:         Attention and Perception: She is attentive.         Speech: Speech normal.         Behavior: Behavior normal.        Assessment:      1. Sprain of right foot, subsequent encounter    2. Encounter related to worker's compensation claim    3. Work related injury      Plan:     Normal foot exam.  Patient has reached MMI and will be discharged at this time.  Advised to discontinue the postop shoe and go back to regular shoes.  Return to clinic as needed.  Patient verbalized understanding and agreement.         Restrictions: Regular Duty, Discharged from Occupational Health  Follow up if symptoms worsen or fail to improve.